# Patient Record
Sex: FEMALE | Race: BLACK OR AFRICAN AMERICAN | Employment: FULL TIME | URBAN - METROPOLITAN AREA
[De-identification: names, ages, dates, MRNs, and addresses within clinical notes are randomized per-mention and may not be internally consistent; named-entity substitution may affect disease eponyms.]

---

## 2021-07-26 ENCOUNTER — HOSPITAL ENCOUNTER (INPATIENT)
Dept: INTERVENTIONAL RADIOLOGY/VASCULAR | Age: 41
Discharge: HOME | DRG: 201 | End: 2021-07-26
Attending: INTERNAL MEDICINE
Payer: COMMERCIAL

## 2021-07-26 ENCOUNTER — APPOINTMENT (OUTPATIENT)
Dept: GENERAL RADIOLOGY | Age: 41
DRG: 201 | End: 2021-07-26
Attending: INTERNAL MEDICINE
Payer: COMMERCIAL

## 2021-07-26 ENCOUNTER — APPOINTMENT (OUTPATIENT)
Dept: GENERAL RADIOLOGY | Age: 41
DRG: 201 | End: 2021-07-26
Attending: EMERGENCY MEDICINE
Payer: COMMERCIAL

## 2021-07-26 ENCOUNTER — HOSPITAL ENCOUNTER (INPATIENT)
Age: 41
LOS: 3 days | Discharge: HOME | DRG: 201 | End: 2021-07-29
Attending: EMERGENCY MEDICINE | Admitting: HOSPITALIST
Payer: COMMERCIAL

## 2021-07-26 VITALS — SYSTOLIC BLOOD PRESSURE: 123 MMHG | HEART RATE: 56 BPM | DIASTOLIC BLOOD PRESSURE: 68 MMHG | OXYGEN SATURATION: 100 %

## 2021-07-26 DIAGNOSIS — J93.9 PNEUMOTHORAX ON LEFT: Primary | ICD-10-CM

## 2021-07-26 DIAGNOSIS — Z72.89 VAPES NON-NICOTINE CONTAINING SUBSTANCE: ICD-10-CM

## 2021-07-26 DIAGNOSIS — R07.9 LEFT-SIDED CHEST PAIN: ICD-10-CM

## 2021-07-26 DIAGNOSIS — J93.83 SPONTANEOUS PNEUMOTHORAX: ICD-10-CM

## 2021-07-26 PROBLEM — F17.211 CIGARETTE NICOTINE DEPENDENCE IN REMISSION: Status: ACTIVE | Noted: 2021-07-26

## 2021-07-26 LAB
ALBUMIN SERPL-MCNC: 3.6 G/DL (ref 3.4–5)
ALBUMIN/GLOB SERPL: 1.2 {RATIO} (ref 0.8–1.7)
ALP SERPL-CCNC: 54 U/L (ref 45–117)
ALT SERPL-CCNC: 24 U/L (ref 13–56)
AMPHET UR QL SCN: NEGATIVE
ANION GAP SERPL CALC-SCNC: 3 MMOL/L (ref 3–18)
APTT PPP: 37.2 SEC (ref 23–36.4)
AST SERPL-CCNC: 20 U/L (ref 10–38)
ATRIAL RATE: 57 BPM
BARBITURATES UR QL SCN: NEGATIVE
BASOPHILS # BLD: 0 K/UL (ref 0–0.1)
BASOPHILS NFR BLD: 1 % (ref 0–2)
BENZODIAZ UR QL: NEGATIVE
BILIRUB SERPL-MCNC: 0.4 MG/DL (ref 0.2–1)
BUN SERPL-MCNC: 18 MG/DL (ref 7–18)
BUN/CREAT SERPL: 26 (ref 12–20)
CALCIUM SERPL-MCNC: 9 MG/DL (ref 8.5–10.1)
CALCULATED P AXIS, ECG09: 72 DEGREES
CALCULATED R AXIS, ECG10: 61 DEGREES
CALCULATED T AXIS, ECG11: 38 DEGREES
CANNABINOIDS UR QL SCN: POSITIVE
CHLORIDE SERPL-SCNC: 110 MMOL/L (ref 100–111)
CK MB CFR SERPL CALC: NORMAL % (ref 0–4)
CK MB SERPL-MCNC: <1 NG/ML (ref 5–25)
CK SERPL-CCNC: 125 U/L (ref 26–192)
CO2 SERPL-SCNC: 27 MMOL/L (ref 21–32)
COCAINE UR QL SCN: NEGATIVE
CREAT SERPL-MCNC: 0.68 MG/DL (ref 0.6–1.3)
DIAGNOSIS, 93000: NORMAL
DIFFERENTIAL METHOD BLD: ABNORMAL
EOSINOPHIL # BLD: 0.1 K/UL (ref 0–0.4)
EOSINOPHIL NFR BLD: 2 % (ref 0–5)
ERYTHROCYTE [DISTWIDTH] IN BLOOD BY AUTOMATED COUNT: 11.9 % (ref 11.6–14.5)
GLOBULIN SER CALC-MCNC: 3.1 G/DL (ref 2–4)
GLUCOSE SERPL-MCNC: 142 MG/DL (ref 74–99)
HCT VFR BLD AUTO: 36.9 % (ref 35–45)
HDSCOM,HDSCOM: ABNORMAL
HGB BLD-MCNC: 12 G/DL (ref 12–16)
INR PPP: 1.1 (ref 0.8–1.2)
LYMPHOCYTES # BLD: 2.3 K/UL (ref 0.9–3.6)
LYMPHOCYTES NFR BLD: 49 % (ref 21–52)
MCH RBC QN AUTO: 30.8 PG (ref 24–34)
MCHC RBC AUTO-ENTMCNC: 32.5 G/DL (ref 31–37)
MCV RBC AUTO: 94.9 FL (ref 74–97)
METHADONE UR QL: NEGATIVE
MONOCYTES # BLD: 0.3 K/UL (ref 0.05–1.2)
MONOCYTES NFR BLD: 6 % (ref 3–10)
NEUTS SEG # BLD: 2 K/UL (ref 1.8–8)
NEUTS SEG NFR BLD: 42 % (ref 40–73)
OPIATES UR QL: NEGATIVE
P-R INTERVAL, ECG05: 130 MS
PCP UR QL: NEGATIVE
PLATELET # BLD AUTO: 237 K/UL (ref 135–420)
PMV BLD AUTO: 9.5 FL (ref 9.2–11.8)
POTASSIUM SERPL-SCNC: 4.4 MMOL/L (ref 3.5–5.5)
PROT SERPL-MCNC: 6.7 G/DL (ref 6.4–8.2)
PROTHROMBIN TIME: 13.4 SEC (ref 11.5–15.2)
Q-T INTERVAL, ECG07: 418 MS
QRS DURATION, ECG06: 78 MS
QTC CALCULATION (BEZET), ECG08: 406 MS
RBC # BLD AUTO: 3.89 M/UL (ref 4.2–5.3)
SODIUM SERPL-SCNC: 140 MMOL/L (ref 136–145)
TROPONIN I SERPL-MCNC: <0.02 NG/ML (ref 0–0.04)
VENTRICULAR RATE, ECG03: 57 BPM
WBC # BLD AUTO: 4.8 K/UL (ref 4.6–13.2)

## 2021-07-26 PROCEDURE — 82553 CREATINE MB FRACTION: CPT

## 2021-07-26 PROCEDURE — 80053 COMPREHEN METABOLIC PANEL: CPT

## 2021-07-26 PROCEDURE — 93005 ELECTROCARDIOGRAM TRACING: CPT

## 2021-07-26 PROCEDURE — 74011250636 HC RX REV CODE- 250/636: Performed by: INTERNAL MEDICINE

## 2021-07-26 PROCEDURE — 74011000250 HC RX REV CODE- 250: Performed by: INTERNAL MEDICINE

## 2021-07-26 PROCEDURE — 85025 COMPLETE CBC W/AUTO DIFF WBC: CPT

## 2021-07-26 PROCEDURE — 85610 PROTHROMBIN TIME: CPT

## 2021-07-26 PROCEDURE — 80307 DRUG TEST PRSMV CHEM ANLYZR: CPT

## 2021-07-26 PROCEDURE — 74011250637 HC RX REV CODE- 250/637: Performed by: FAMILY MEDICINE

## 2021-07-26 PROCEDURE — 65610000006 HC RM INTENSIVE CARE

## 2021-07-26 PROCEDURE — 71046 X-RAY EXAM CHEST 2 VIEWS: CPT

## 2021-07-26 PROCEDURE — 94761 N-INVAS EAR/PLS OXIMETRY MLT: CPT

## 2021-07-26 PROCEDURE — 0W9B30Z DRAINAGE OF LEFT PLEURAL CAVITY WITH DRAINAGE DEVICE, PERCUTANEOUS APPROACH: ICD-10-PCS | Performed by: RADIOLOGY

## 2021-07-26 PROCEDURE — 85730 THROMBOPLASTIN TIME PARTIAL: CPT

## 2021-07-26 PROCEDURE — 99284 EMERGENCY DEPT VISIT MOD MDM: CPT

## 2021-07-26 PROCEDURE — 74011250637 HC RX REV CODE- 250/637: Performed by: HOSPITALIST

## 2021-07-26 PROCEDURE — 74011250636 HC RX REV CODE- 250/636: Performed by: HOSPITALIST

## 2021-07-26 PROCEDURE — C1769 GUIDE WIRE: HCPCS

## 2021-07-26 PROCEDURE — 71045 X-RAY EXAM CHEST 1 VIEW: CPT

## 2021-07-26 RX ORDER — OXYCODONE HYDROCHLORIDE 5 MG/1
10 TABLET ORAL
Status: DISCONTINUED | OUTPATIENT
Start: 2021-07-26 | End: 2021-07-29 | Stop reason: HOSPADM

## 2021-07-26 RX ORDER — MORPHINE SULFATE 2 MG/ML
1 INJECTION, SOLUTION INTRAMUSCULAR; INTRAVENOUS ONCE
Status: COMPLETED | OUTPATIENT
Start: 2021-07-26 | End: 2021-07-26

## 2021-07-26 RX ORDER — MORPHINE SULFATE 2 MG/ML
2 INJECTION, SOLUTION INTRAMUSCULAR; INTRAVENOUS
Status: DISCONTINUED | OUTPATIENT
Start: 2021-07-26 | End: 2021-07-27

## 2021-07-26 RX ORDER — FAMOTIDINE 20 MG/1
20 TABLET, FILM COATED ORAL 2 TIMES DAILY
Status: DISCONTINUED | OUTPATIENT
Start: 2021-07-26 | End: 2021-07-29 | Stop reason: HOSPADM

## 2021-07-26 RX ORDER — ACETAMINOPHEN 500 MG
1000 TABLET ORAL
Status: DISCONTINUED | OUTPATIENT
Start: 2021-07-26 | End: 2021-07-29 | Stop reason: HOSPADM

## 2021-07-26 RX ORDER — FENTANYL CITRATE 50 UG/ML
25-200 INJECTION, SOLUTION INTRAMUSCULAR; INTRAVENOUS
Status: DISCONTINUED | OUTPATIENT
Start: 2021-07-26 | End: 2021-07-26 | Stop reason: ALTCHOICE

## 2021-07-26 RX ORDER — MIDAZOLAM HYDROCHLORIDE 1 MG/ML
.5-4 INJECTION, SOLUTION INTRAMUSCULAR; INTRAVENOUS
Status: DISCONTINUED | OUTPATIENT
Start: 2021-07-26 | End: 2021-07-26 | Stop reason: ALTCHOICE

## 2021-07-26 RX ORDER — LIDOCAINE HYDROCHLORIDE 10 MG/ML
1-20 INJECTION INFILTRATION; PERINEURAL
Status: DISCONTINUED | OUTPATIENT
Start: 2021-07-26 | End: 2021-07-30 | Stop reason: HOSPADM

## 2021-07-26 RX ORDER — SODIUM CHLORIDE 9 MG/ML
500 INJECTION, SOLUTION INTRAVENOUS
Status: COMPLETED | OUTPATIENT
Start: 2021-07-26 | End: 2021-07-26

## 2021-07-26 RX ADMIN — MORPHINE SULFATE 1 MG: 2 INJECTION, SOLUTION INTRAMUSCULAR; INTRAVENOUS at 16:45

## 2021-07-26 RX ADMIN — LIDOCAINE HYDROCHLORIDE 10 ML: 10 INJECTION, SOLUTION INFILTRATION; PERINEURAL at 17:38

## 2021-07-26 RX ADMIN — FAMOTIDINE 20 MG: 20 TABLET ORAL at 20:52

## 2021-07-26 RX ADMIN — OXYCODONE 10 MG: 5 TABLET ORAL at 20:52

## 2021-07-26 RX ADMIN — SODIUM CHLORIDE 500 ML: 900 INJECTION, SOLUTION INTRAVENOUS at 17:38

## 2021-07-26 NOTE — PROCEDURES
Interventional Radiology Brief Procedure Note    Interventional Radiologist: Pedro Luis Worthington MD    Pre-operative Diagnosis: left pneumothorax    Post-operative Diagnosis: Same as pre-operative diagnosis    Procedure(s) Performed:  Left thoracostomy tube placement    Anesthesia:  Local    Findings: 8 fr tube placed left chest. To pleur vac. Full report to follow.      Complications: None    Estimated Blood Loss:  minimal    Specimens: None    Condition: Good    Disposition: olvera    Pedro Luis Worthington MD  [facility]  7/26/2021

## 2021-07-26 NOTE — Clinical Note
Status[de-identified] INPATIENT [101]   Type of Bed: Intensive Care [6]   Cardiac Monitoring Required?: Yes   Inpatient Hospitalization Certified Necessary for the Following Reasons: 3.  Patient receiving treatment that can only be provided in an inpatient setting (further clarification in H&P documentation)   Admitting Diagnosis: Pneumothorax on left [[id number]]   Admitting Physician: Miguel Davis [[id number]]   Attending Physician: Miguel Davis [[id number]]   Estimated Length of Stay: 3-4 Midnights   Discharge Plan[de-identified] Home with Office Follow-up

## 2021-07-26 NOTE — PROGRESS NOTES
Pt transported back to ICU with radiology RNs and angio techs in no apparent distress post chest tube insertion. Pleur-a-vac set up and connected at bedside. Report given to ICU RN bedside.

## 2021-07-26 NOTE — H&P
History & Physical    Patient: Harsha Rowe MRN: [id number]  CSN: [id number]    YOB: 1980  Age: 41 y.o. Sex: female      DOA: 7/26/2021  Primary Care Provider:  Isabella Sanderson MD      Assessment/Plan     Patient Active Problem List   Diagnosis Code    Pneumothorax on left J93.9    Left-sided chest pain R07.9    Vapes non-nicotine containing substance Z72.89    Spontaneous pneumothorax J93.83    Cigarette nicotine dependence in remission F17.211       Admit to ICU    CRITICAL CARE PLAN    Resp -   Left pneumothorax -  Chest tube planned  On oxygen by NC,   Follow daily CXR  Seen by PCCM    ID -   no evidence of infection    CVS -   Monitor HD. Heme/onc -   Follow H&H, plts. Renal -   Trend BUN, Cr. Check and replace Mg, K, phos. Endocrine -    Follow FSG    Neuro/ Pain/ Sedation -   Stable mental status    GI - regular diet. Prophylaxis - DVT: scd, GI: pepcid. Estimated length of stay : TBD    CC: left sided chest pain       HPI:     Harsha Rowe is a 41 y.o. female with no significant past history presents to ER with concerns of left-sided chest pain. Patient reports that she had sharp stabbing chest pain that started 2 days ago. She was seen at urgent care and initially she was told that she has fluid around her heart she was discharged on amoxicillin and indomethacin. This morning she was called and was asked to go to the emergency room as the chest x-ray showed no pneumothorax. She does endorse some shortness of breath but does not appear to be in respiratory distress. She quit smoking and now vapes. In ER she was noted to have left pneumothorax. History reviewed. No pertinent past medical history. Past Surgical History:   Procedure Laterality Date    HX CHOLECYSTECTOMY         History reviewed. No pertinent family history.     Social History     Socioeconomic History    Marital status: SINGLE     Spouse name: Not on file    Number of children: Not on file    Years of education: Not on file    Highest education level: Not on file   Tobacco Use    Smoking status: Never Smoker    Smokeless tobacco: Current User     Social Determinants of Health     Financial Resource Strain:     Difficulty of Paying Living Expenses:    Food Insecurity:     Worried About Running Out of Food in the Last Year:     [address] in the Last Year:    Transportation Needs:     Lack of Transportation (Medical):  Lack of Transportation (Non-Medical):    Physical Activity:     Days of Exercise per Week:     Minutes of Exercise per Session:    Stress:     Feeling of Stress :    Social Connections:     Frequency of Communication with Friends and Family:     Frequency of Social Gatherings with Friends and Family:     Attends [employer] Services:     Active Member of Clubs or Organizations:     Attends Club or Organization Meetings:     Marital Status:        Prior to Admission medications    Not on File       No Known Allergies    Review of Systems  Gen: No fever, chills, malaise, weight loss/gain. Heent: No headache, rhinorrhea, epistaxis, ear pain, hearing loss, sinus pain, neck pain/stiffness, sore throat. Heart: See above  Resp: No cough, hemoptysis, wheezing and shortness of breath. GI: No nausea, vomiting, diarrhea, constipation, melena or hematochezia. : No urinary obstruction, dysuria or hematuria. Derm: No rash, new skin lesion or pruritis. Musc/skeletal: no bone or joint complains. Vasc: No edema, cyanosis or claudication. Endo: No heat/cold intolerance, no polyuria,polydipsia or polyphagia. Neuro: No unilateral weakness, numbness, tingling. No seizures. Heme: No easy bruising or bleeding.           Physical Exam:     Physical Exam:  Visit Vitals  BP (!) 120/98   Pulse (!) 57   Temp 98 °F (36.7 °C)   Resp 14   Ht 5' 4\" (1.626 m)   Wt 61.7 kg (136 lb)   SpO2 100%   BMI 23.34 kg/m²      O2 Device: None (Room air)    Temp (24hrs), Av.1 °F (36.7 °C), Min:98 °F (36.7 °C), Max:98.2 °F (36.8 °C)    No intake/output data recorded. No intake/output data recorded. General:  Awake, cooperative, no distress. Head:  Normocephalic, without obvious abnormality, atraumatic. Eyes:  Conjunctivae/corneas clear, sclera anicteric, PERRL, EOMs intact. Nose: Nares normal. No drainage or sinus tenderness. Throat: Lips, mucosa, and tongue normal.    Neck: Supple, symmetrical, trachea midline, no adenopathy. Lungs:    Absent breath sounds left side. Heart:   S1, S2, no murmur, click, rub or gallop. Abdomen: Soft, non-tender. Bowel sounds normal. No masses,  No organomegaly. Extremities: Extremities normal, atraumatic, no cyanosis or edema. Capillary refill normal.   Pulses: 2+ and symmetric all extremities. Skin: Skin color pink, turgor normal. No rashes or lesions   Neurologic: CNII-XII intact. No focal motor or sensory deficit.        Labs Reviewed:    CMP:   Lab Results   Component Value Date/Time     07/26/2021 01:11 PM    K 4.4 07/26/2021 01:11 PM     07/26/2021 01:11 PM    CO2 27 07/26/2021 01:11 PM    AGAP 3 07/26/2021 01:11 PM     (H) 07/26/2021 01:11 PM    BUN 18 07/26/2021 01:11 PM    CREA 0.68 07/26/2021 01:11 PM    GFRAA >60 07/26/2021 01:11 PM    GFRNA >60 07/26/2021 01:11 PM    CA 9.0 07/26/2021 01:11 PM    ALB 3.6 07/26/2021 01:11 PM    TP 6.7 07/26/2021 01:11 PM    GLOB 3.1 07/26/2021 01:11 PM    AGRAT 1.2 07/26/2021 01:11 PM    ALT 24 07/26/2021 01:11 PM     CBC:   Lab Results   Component Value Date/Time    WBC 4.8 07/26/2021 01:11 PM    HGB 12.0 07/26/2021 01:11 PM    HCT 36.9 07/26/2021 01:11 PM     07/26/2021 01:11 PM     All Cardiac Markers in the last 24 hours:   Lab Results   Component Value Date/Time     07/26/2021 01:11 PM    CKMB <1.0 07/26/2021 01:11 PM    CKND1  07/26/2021 01:11 PM     CALCULATION NOT PERFORMED WHEN RESULT IS BELOW LINEAR LIMIT    Lori Older <0.02 07/26/2021 01:11 PM Procedures/imaging: see electronic medical records for all procedures/Xrays and details which were not copied into this note but were reviewed prior to creation of Plan    Please note that this dictation was completed with [employer], the computer voice recognition software. Quite often unanticipated grammatical, syntax, homophones, and other interpretive errors are inadvertently transcribed by the computer software. Please disregard these errors. Please excuse any errors that have escaped final proofreading.         CC: Pastor Jay MD

## 2021-07-26 NOTE — ED PROVIDER NOTES
EMERGENCY DEPARTMENT HISTORY AND PHYSICAL EXAM    Date: 7/26/2021  Patient Name: Stefano Tadeo    History of Presenting Illness     Chief Complaint   Patient presents with    Shortness of Breath         History Provided By: Patient and EMS    Stefano Tadeo is a 41 y.o. female who presents to the emergency department C/O left-sided chest pain, pneumothorax. Patient states her symptoms started abruptly 2 days ago on Saturday. She localized the pain to the left side of her chest with associated shortness of breath. States on Sunday, yesterday she went to [facility] where she had an x-ray done. States she was told maybe she had some fluid around her heart and was given a prescription for indomethacin and antibiotics. She states the indomethacin does seem to help with her pain. She denies any history of lung problems. Denies any COPD or asthma. She does note that she was a longtime cigarette smoker but has mostly been vaping over the last year. Domenica Watson PCP: None        Past History     Past Medical History:  History reviewed. No pertinent past medical history. Past Surgical History:  History reviewed. No pertinent surgical history. Family History:  History reviewed. No pertinent family history. Social History:  Social History     Tobacco Use    Smoking status: Vapes regularly for over 1 year   Substance Use Topics    Alcohol use: None    Drug use: None       Allergies:  No Known Allergies      Review of Systems   Review of Systems   Constitutional: Negative for fever. Respiratory: Positive for chest tightness and shortness of breath. Cardiovascular: Positive for chest pain. Gastrointestinal: Negative for abdominal pain, nausea and vomiting. All other systems reviewed and are negative. All other systems reviewed and are negative.     Physical Exam     Vitals:    07/26/21 1308   BP: 130/73   Pulse: 62   Resp: 20   Temp: 98.2 °F (36.8 °C)   SpO2: 99%   Weight: 61.7 kg (136 lb) Height: 5' 4\" (1.626 m)     Physical Exam    Nursing notes and vital signs reviewed    Constitutional: Non toxic appearing, no acute distress, appearing stated age  Eyes: PERRL, EOMI, No conjunctival injection  ENT: external ears normal, No rhinorrhea, external nose normal, mucous membranes moist  Cardiovascular: Regular rate and rhythm, no murmurs, No JVD  Respiratory: Mild decreased lung sounds on the left versus the right, No stridor, Normal work of breathing and chest excursion bilaterally  Abdomen: Soft, non tender, non distended, normoactive bowel sounds, No rigidity, no peritoneal signs  Musculoskeletal:  No evidence of obvious injury to Head, Neck, Back, Extremities, no LE edema  Skin: Warm, dry, No obvious rashes  Neuro: Alert and oriented x 3, CN 2-12 intact, normal speech, strength and sensation full and symmetric bilaterally  Psychiatric: Normal mood and affect      Diagnostic Study Results     Labs -     Recent Results (from the past 72 hour(s))   EKG, 12 LEAD, INITIAL    Collection Time: 07/26/21  1:10 PM   Result Value Ref Range    Ventricular Rate 57 BPM    Atrial Rate 57 BPM    P-R Interval 130 ms    QRS Duration 78 ms    Q-T Interval 418 ms    QTC Calculation (Bezet) 406 ms    Calculated P Axis 72 degrees    Calculated R Axis 61 degrees    Calculated T Axis 38 degrees    Diagnosis       Sinus bradycardia  Low voltage QRS  Nonspecific T wave abnormality  Abnormal ECG  No previous ECGs available     CBC WITH AUTOMATED DIFF    Collection Time: 07/26/21  1:11 PM   Result Value Ref Range    WBC 4.8 4.6 - 13.2 K/uL    RBC 3.89 (L) 4.20 - 5.30 M/uL    HGB 12.0 12.0 - 16.0 g/dL    HCT 36.9 35.0 - 45.0 %    MCV 94.9 74.0 - 97.0 FL    MCH 30.8 24.0 - 34.0 PG    MCHC 32.5 31.0 - 37.0 g/dL    RDW 11.9 11.6 - 14.5 %    PLATELET 782 855 - 219 K/uL    MPV 9.5 9.2 - 11.8 FL    NEUTROPHILS 42 40 - 73 %    LYMPHOCYTES 49 21 - 52 %    MONOCYTES 6 3 - 10 %    EOSINOPHILS 2 0 - 5 %    BASOPHILS 1 0 - 2 %    ABS. NEUTROPHILS 2.0 1.8 - 8.0 K/UL    ABS. LYMPHOCYTES 2.3 0.9 - 3.6 K/UL    ABS. MONOCYTES 0.3 0.05 - 1.2 K/UL    ABS. EOSINOPHILS 0.1 0.0 - 0.4 K/UL    ABS. BASOPHILS 0.0 0.0 - 0.1 K/UL    DF AUTOMATED         Radiologic Studies -   XR CHEST INSP AND EXP   Final Result         1. Large left pneumothorax without evidence of mediastinal shift. CRITICAL RESULT:  Findings called to Dr. Queenie Albert in the emergency room prior to   dictation at 1328 hours, 7/26/2021       IR THORACENTESIS/INSERT CHEST TUBE    (Results Pending)     CT Results  (Last 48 hours)    None        CXR Results  (Last 48 hours)    None          Medications given in the ED-  Medications - No data to display      Medical Decision Making     I reviewed the vital signs, available nursing notes, past medical history, past surgical history, family history and social history. Vital Signs interpretation- I have reviewed the patient's vital signs. Pulse Oximetry interpretation - 99% on Room air     Cardiac Monitor interpretation:  Rate: 62 bpm  Rhythm: sinus    EKG interpretation: (Preliminary)  EKG interpretation by Dr. Wale Connell 57 sinus bradycardia, low voltage QRS, nonspecific ST changes and mild baseline wandering. Records Reviewed: Nursing Notes and Old Medical Records    Procedures:  Procedures    ED Course and MDM:  1:22 PM  Chest x-ray does show significant left-sided pneumothorax greater than 50%. CONSULT NOTE:   Dr. Ryan Echavarria spoke with Dr. Kenya Aggarwal   Specialty: pulm  Discussed pt's hx, disposition, and available diagnostic and imaging results over the telephone. Reviewed care plans. States he will come see the patient and eval with IR to put in the chest tube. I discussed with the patient the results of her laboratory findings and imaging studies. Recommended the need for urgent chest tube placement on the left side and admission for continued evaluation of the pneumothorax in the critical care unit.   She understands and agrees to plan    Diagnosis and Disposition     Critical Care Time: 1:58 PM  I have spent 30 minutes of critical care time involved in lab review, consultations with specialist, family decision-making, and documentation. During this entire length of time I was immediately available to the patient. Critical Care: The reason for providing this level of medical care for this critically ill patient was due a critical illness that impaired one or more vital organ systems such that there was a high probability of imminent or life threatening deterioration in the patients condition. This care involved high complexity decision making to assess, manipulate, and support vital system functions, to treat this degreee vital organ system failure and to prevent further life threatening deterioration of the patients condition. Core Measures:  For Hospitalized Patients:    1. Hospitalization Decision Time:  The decision to hospitalize the patient was made by Ines Meier DO at 1:58 PM on 7/26/2021    2. Aspirin: Aspirin was not given because the patient did not present with a stroke at the time of their Emergency Department evaluation    1:58 PM  Patient is being admitted to the hospital by Dr. Tere Loo. The results of their tests and reasons for their admission have been discussed with them and/or available family. They convey agreement and understanding for the need to be admitted and for their admission diagnosis. CONDITIONS ON ADMISSION:  Sepsis is not present at the time of admission. Deep Vein Thrombosis is not present at the time of admission. Thrombosis is not present at the time of admission. Urinary Tract Infection is not present at the time of admission. Pneumonia is not present at the time of admission. MRSA is not present at the time of admission. Wound infection is not present at the time of admission. Pressure Ulcer is not present at the time of admission. CLINICAL IMPRESSION:    1. Pneumothorax on left    2. Left-sided chest pain    3. Vapes non-nicotine containing substance    4. Spontaneous pneumothorax            Please note that this dictation was completed with [employer], the computer voice recognition software. Quite often unanticipated grammatical, syntax, homophones, and other interpretive errors are inadvertently transcribed by the computer software. Please disregard these errors. Please excuse any errors that have escaped final proofreading.

## 2021-07-26 NOTE — PROGRESS NOTES
Pt educated about procedure and sedation, verbalizes understanding. Pt denies complications from sedation/anesthesia in the past. Will continue to monitor.

## 2021-07-26 NOTE — PROGRESS NOTES
1812  Patient complaining of pleuritic pain . . darryl overton.. recvd one tme order for 1 mg morphine

## 2021-07-26 NOTE — CONSULTS
P[facility] Specialists  Pulmonary, Critical Care, and Sleep Medicine    Name: Gerald Bustillo MRN: [id number]   : 1980 Hospital: [facility] [place]    Date: 2021  Room: [facility]/[address] [place] [address] Note                                              Consult requesting physician: Dr. Dev Reddy  Reason for Consult: Pneumothorax      IMPRESSION:   ·   ·   Patient Active Problem List   Diagnosis Code    Pneumothorax on left J93.9    Left-sided chest pain R07.9    Vapes non-nicotine containing substance Z72.89    Spontaneous pneumothorax J93.83    Cigarette nicotine dependence in remission F17.211       · Code status: Full code      RECOMMENDATIONS:   Respiratory: Primary spontaneous left pneumothorax. Hx former cigarette smoker; quit about 2 years ago in 2019; since then started nicotine vaping. On 2021, sudden onset of left-sided sharp stabbing chest pain while lying down and not being active. Urgent care visit on 2021; CXR was performed, initially reported left pericardial likely pneumonia; discharged home on amoxicillin and indomethacin; patient received phone call on 2021 with CXR final report showing left pneumothorax and sent to ER. Repeat CXR 2021 at THE [facility] ER showed large left PTX without mediastinal shift. Patient on room air with SPO2 98%; not in respiratory distress. D/w Dr. Eva Valladares who will place pigtail chest tube catheter. We will place pigtail chest tube catheter at 20 cm of water wall suction. Once air leak resolves in the chest tube apparatus, then will stop the suction and clamp the tube and repeat CXR. If CXR remains without pneumothorax, then will consider removing chest tube depending on the clinical course. Discussed with patient that if the chest tube airleak does not resolve, then she may need thoracic surgery evaluation. Judicious use of pain medication. Start O2 four LPM NC.   Strongly advised and encouraged not to restart smoking cigarettes and quit vaping tobacco.  Keep SPO2 >=92%. HOB 30 degree elevation all the time. Aggressive pulmonary toileting. Aspiration precautions. Incentive spirometry. CVS: Blood pressure stable. Monitor. ID: No acute issues. Hematology/Oncology: Normal hemoglobin, platelets, coags. Monitor. Renal: Normal creatinine. No acute issues. GI/: No acute issues. Endocrine: Monitor BS. Neurology: Alert awake oriented x3. No focal deficit. No acute issues. Toxicology: Urine drug screen ordered. Pain/Sedation: Judicious use of pain medication. Skin/Wound: After chest tube placement, continue with local care at the chest tube site. Electrolytes: Replace electrolytes per ICU electrolyte replacement protocol. IVF: None  Nutrition: P.o. Prophylaxis: DVT Prophylaxis: SCD/start heparin or Lovenox tomorrow if there is no bleeding at chest tube site. GI Prophylaxis: Low risk for stress ulcer. Restraints: none  Lines/Tubes: PIV  Left pigtail chest tube to be placed 7/26/2021 by IR. Will defer respective systems problem management to primary and other respective consultant and follow patient in ICU with primary and other medical team.  Further recommendations will be based on the patient's response to recommended treatment and results of the investigation ordered. Quality Care: PPI, DVT prophylaxis, HOB elevated, Infection control all reviewed and addressed. Care of plan d/w hospitalist team, RN, RT, MDR.  D/w patient, family-mother at bedside in ER (answered all questions to satisfaction). High complexity decision making was performed during the evaluation of this patient at high risk for decompensation with multiple organ involvement. Total critical care time spent rendering care exclusive of procedures/family discussion/coordination of care: 33 minutes.          Subjective/History of Present Illness:     Patient is a 41 y.o. female with PMHx significant for former cigarette smoker quit 2019, nicotine vapor since 2019; admitted with primary spontaneous left pneumothorax. Hx former cigarette smoker; quit about 2 years ago in 2019; since then started nicotine vaping. On 7/24/2021, sudden onset of left-sided sharp stabbing chest pain while lying down and not being active. Urgent care visit on 7/25/2021; CXR was performed, initially reported left pericardial likely pneumonia; discharged home on amoxicillin and indomethacin; patient received phone call on 7/26/2021 with CXR final report showing left pneumothorax and sent to ER. Repeat CXR 7/26/2021 at THE [facility] ER showed large left PTX without mediastinal shift. Patient on room air with SPO2 98%; not in respiratory distress. D/w Dr. Erik Sung who will place pigtail chest tube catheter. 7/26/2021:  Seen in ER room one. Mother at bedside. Patient is sitting on a stretcher. On room air resting; SPO2 98%. Hemodynamically stable. Denies any current chest pain. Denies fever, cough, dyspnea at rest, wheezing, chest tightness, hemoptysis, weight loss, nausea, vomiting, lightheadedness, dizziness. Patient had dyspnea at the time of chest pain on 7/24/2021, resolved now. I/O last 24 hrs: No intake or output data in the 24 hours ending 07/26/21 1434    History taken from patient, EMR     Review of Systems:   HEENT: No epistaxis, no nasal drainage, no difficulty in swallowing, no redness in eyes  Respiratory: as above  Cardiovascular: no chest pain, no palpitations, no chronic leg edema, no syncope  Gastrointestinal: no abd pain, no vomiting, no diarrhea, no bleeding symptoms  Genitourinary: No urinary symptoms or hematuria  Musculoskeletal: Neg  Neurological: No focal weakness, no seizures, no headaches  Behvioral/Psych: No anxiety, no depression  Constitutional: No fever, no chills, no weight loss, no night sweats     No Known Allergies   History reviewed. No pertinent past medical history. History reviewed. No pertinent surgical history.    Social History     Tobacco Use    Smoking status: Never Smoker    Smokeless tobacco: Current User   Substance Use Topics    Alcohol use: Not on file      History reviewed. No pertinent family history. Prior to Admission medications    Not on File     No current facility-administered medications for this encounter. Objective:   Vital Signs:    Visit Vitals  BP 96/71   Pulse 64   Temp 98 °F (36.7 °C)   Resp 20   Ht 5' 4\" (1.626 m)   Wt 61.7 kg (136 lb)   SpO2 (!) 89%   BMI 23.34 kg/m²       O2 Device: None (Room air)       Temp (24hrs), Av.1 °F (36.7 °C), Min:98 °F (36.7 °C), Max:98.2 °F (36.8 °C)       Intake/Output:   Last shift:      No intake/output data recorded. Last 3 shifts: No intake/output data recorded. No intake or output data in the 24 hours ending 21 1434    Last 3 Recorded Weights in this Encounter    21 1308   Weight: 61.7 kg (136 lb)       Physical Exam:     General/Neurology: Alert, Awake, NAD. Head:   Normocephalic, without obvious abnormality, atraumatic. Eye:   EOM intact. No scleral icterus, no pallor, no cyanosis. Nose:   No sinus tenderness  Throat:  Lips, mucosa, and tongue normal. No oral thrush. Neck:   Supple, symmetric. No lymphadenopathy. Trachea midline  Lung: Moderate air entry bilateral equal. No rales. No rhonchi. No wheezing. No stridors. No prolongded expiration. No accessory muscle use. Heart:   Regular rate & rhythm. S1 S2 present. No murmur. No JVD. Abdomen:  Soft. NT. ND. +BS. No masses. Extremities:  No pedal edema. No cyanosis. No clubbing. Pulses: 2+ and symmetric in DP. Capillary refill: normal  Lymphatic:  No cervical or supraclavicular palpable lymphadenopathy. Musculoskeletal: No joint swelling. No tenderness. Skin:   Color, texture, turgor normal. No rashes or lesions.        Data:       Recent Results (from the past 24 hour(s))   EKG, 12 LEAD, INITIAL    Collection Time: 21  1:10 PM   Result Value Ref Range Ventricular Rate 57 BPM    Atrial Rate 57 BPM    P-R Interval 130 ms    QRS Duration 78 ms    Q-T Interval 418 ms    QTC Calculation (Bezet) 406 ms    Calculated P Axis 72 degrees    Calculated R Axis 61 degrees    Calculated T Axis 38 degrees    Diagnosis       Sinus bradycardia  Low voltage QRS  Nonspecific T wave abnormality  Abnormal ECG  No previous ECGs available     CBC WITH AUTOMATED DIFF    Collection Time: 07/26/21  1:11 PM   Result Value Ref Range    WBC 4.8 4.6 - 13.2 K/uL    RBC 3.89 (L) 4.20 - 5.30 M/uL    HGB 12.0 12.0 - 16.0 g/dL    HCT 36.9 35.0 - 45.0 %    MCV 94.9 74.0 - 97.0 FL    MCH 30.8 24.0 - 34.0 PG    MCHC 32.5 31.0 - 37.0 g/dL    RDW 11.9 11.6 - 14.5 %    PLATELET 916 964 - 647 K/uL    MPV 9.5 9.2 - 11.8 FL    NEUTROPHILS 42 40 - 73 %    LYMPHOCYTES 49 21 - 52 %    MONOCYTES 6 3 - 10 %    EOSINOPHILS 2 0 - 5 %    BASOPHILS 1 0 - 2 %    ABS. NEUTROPHILS 2.0 1.8 - 8.0 K/UL    ABS. LYMPHOCYTES 2.3 0.9 - 3.6 K/UL    ABS. MONOCYTES 0.3 0.05 - 1.2 K/UL    ABS. EOSINOPHILS 0.1 0.0 - 0.4 K/UL    ABS. BASOPHILS 0.0 0.0 - 0.1 K/UL    DF AUTOMATED     METABOLIC PANEL, COMPREHENSIVE    Collection Time: 07/26/21  1:11 PM   Result Value Ref Range    Sodium 140 136 - 145 mmol/L    Potassium 4.4 3.5 - 5.5 mmol/L    Chloride 110 100 - 111 mmol/L    CO2 27 21 - 32 mmol/L    Anion gap 3 3.0 - 18 mmol/L    Glucose 142 (H) 74 - 99 mg/dL    BUN 18 7.0 - 18 MG/DL    Creatinine 0.68 0.6 - 1.3 MG/DL    BUN/Creatinine ratio 26 (H) 12 - 20      GFR est AA >60 >60 ml/min/1.73m2    GFR est non-AA >60 >60 ml/min/1.73m2    Calcium 9.0 8.5 - 10.1 MG/DL    Bilirubin, total 0.4 0.2 - 1.0 MG/DL    ALT (SGPT) 24 13 - 56 U/L    AST (SGOT) 20 10 - 38 U/L    Alk.  phosphatase 54 45 - 117 U/L    Protein, total 6.7 6.4 - 8.2 g/dL    Albumin 3.6 3.4 - 5.0 g/dL    Globulin 3.1 2.0 - 4.0 g/dL    A-G Ratio 1.2 0.8 - 1.7     CARDIAC PANEL,(CK, CKMB & TROPONIN)    Collection Time: 07/26/21  1:11 PM   Result Value Ref Range    CK - MB <1.0 <3.6 ng/ml    CK-MB Index  0.0 - 4.0 %     CALCULATION NOT PERFORMED WHEN RESULT IS BELOW LINEAR LIMIT     26 - 192 U/L    Troponin-I, QT <0.02 0.0 - 0.045 NG/ML   PROTHROMBIN TIME + INR    Collection Time: 07/26/21  1:11 PM   Result Value Ref Range    Prothrombin time 13.4 11.5 - 15.2 sec    INR 1.1 0.8 - 1.2     PTT    Collection Time: 07/26/21  1:11 PM   Result Value Ref Range    aPTT 37.2 (H) 23.0 - 36.4 SEC         Chemistry Recent Labs     07/26/21  1311   *      K 4.4      CO2 27   BUN 18   CREA 0.68   CA 9.0   AGAP 3   BUCR 26*   AP 54   TP 6.7   ALB 3.6   GLOB 3.1   AGRAT 1.2        Lactic Acid No results found for: LAC  No results for input(s): LAC in the last 72 hours. Liver Enzymes Protein, total   Date Value Ref Range Status   07/26/2021 6.7 6.4 - 8.2 g/dL Final     Albumin   Date Value Ref Range Status   07/26/2021 3.6 3.4 - 5.0 g/dL Final     Globulin   Date Value Ref Range Status   07/26/2021 3.1 2.0 - 4.0 g/dL Final     A-G Ratio   Date Value Ref Range Status   07/26/2021 1.2 0.8 - 1.7   Final     Alk.  phosphatase   Date Value Ref Range Status   07/26/2021 54 45 - 117 U/L Final     Recent Labs     07/26/21  1311   TP 6.7   ALB 3.6   GLOB 3.1   AGRAT 1.2   AP 54        CBC w/Diff Recent Labs     07/26/21  1311   WBC 4.8   RBC 3.89*   HGB 12.0   HCT 36.9      GRANS 42   LYMPH 49   EOS 2        Cardiac Enzymes Lab Results   Component Value Date/Time     07/26/2021 01:11 PM    CKMB <1.0 07/26/2021 01:11 PM    CKND1  07/26/2021 01:11 PM     CALCULATION NOT PERFORMED WHEN RESULT IS BELOW LINEAR LIMIT    Deborah Flies <0.02 07/26/2021 01:11 PM        BNP No results found for: BNP, BNPP, XBNPT     Coagulation Recent Labs     07/26/21  1311   PTP 13.4   INR 1.1   APTT 37.2*         Thyroid  No results found for: T4, T3U, TSH, TSHEXT, TSHEXT    No results found for: T4     Urinalysis Lab Results   Component Value Date/Time    Color YELLOW 12/06/2014 02:28 AM Appearance CLEAR 12/06/2014 02:28 AM    Specific gravity 1.020 12/06/2014 02:28 AM    pH (UA) 7.5 12/06/2014 02:28 AM    Protein NEGATIVE  12/06/2014 02:28 AM    Glucose NEGATIVE  12/06/2014 02:28 AM    Ketone NEGATIVE  12/06/2014 02:28 AM    Bilirubin NEGATIVE  12/06/2014 02:28 AM    Urobilinogen 0.2 12/06/2014 02:28 AM    Nitrites NEGATIVE  12/06/2014 02:28 AM    Leukocyte Esterase TRACE (A) 12/06/2014 02:28 AM    Epithelial cells 3+ 12/06/2014 02:28 AM    Bacteria 2+ (A) 12/06/2014 02:28 AM    WBC 3 to 5 12/06/2014 02:28 AM    RBC 0 to 3 12/06/2014 02:28 AM        Micro  No results for input(s): SDES, CULT in the last 72 hours. No results for input(s): CULT in the last 72 hours. Culture data during this hospitalization. All Micro Results     None               CXR reviewed by me:  XR (Most Recent). CXR  reviewed by me and compared with previous CXR Results from Hospital Encounter encounter on 07/26/21    XR CHEST INSP AND EXP    Narrative  EXAM: XR CHEST INSP AND EXP    CLINICAL INDICATION/HISTORY: Left-sided pneumothorax and chest pain  -Additional: None    COMPARISON: None    TECHNIQUE: Frontal view of the chest obtained across inspiration and expiration    _______________    FINDINGS:    HEART AND MEDIASTINUM: Normal cardiac size and mediastinal contours. LUNGS AND PLEURAL SPACES: Large left-sided pneumothorax with collapse of the  left upper and left lower lobes. No evidence of mediastinal shift. Right lung  clear. BONY THORAX AND SOFT TISSUES: No acute osseous abnormality demonstrated.    _______________    Impression  1. Large left pneumothorax without evidence of mediastinal shift. CRITICAL RESULT:  Findings called to Dr. Enmanuel Anand in the emergency room prior to  dictation at 1328 hours, 7/26/2021         ·Please note: Voice-recognition software may have been used to generate this report, which may have resulted in some phonetic-based errors in grammar and contents.  Even though attempts were made to correct all the mistakes, some may have been missed, and remained in the body of the document.       Germán Benitez MD  7/26/2021

## 2021-07-26 NOTE — ED TRIAGE NOTES
Patient with complaints of shortness of breath that started on Saturday. Patient was seen yesterday and placed on antibiotics yesterday.   Patient was called today and told she had a collapsed lung and to call [phone]

## 2021-07-27 ENCOUNTER — APPOINTMENT (OUTPATIENT)
Dept: GENERAL RADIOLOGY | Age: 41
DRG: 201 | End: 2021-07-27
Attending: INTERNAL MEDICINE
Payer: COMMERCIAL

## 2021-07-27 LAB
ANION GAP SERPL CALC-SCNC: 5 MMOL/L (ref 3–18)
BUN SERPL-MCNC: 9 MG/DL (ref 7–18)
BUN/CREAT SERPL: 17 (ref 12–20)
CALCIUM SERPL-MCNC: 8.7 MG/DL (ref 8.5–10.1)
CHLORIDE SERPL-SCNC: 109 MMOL/L (ref 100–111)
CO2 SERPL-SCNC: 27 MMOL/L (ref 21–32)
CREAT SERPL-MCNC: 0.52 MG/DL (ref 0.6–1.3)
ERYTHROCYTE [DISTWIDTH] IN BLOOD BY AUTOMATED COUNT: 12 % (ref 11.6–14.5)
GLUCOSE SERPL-MCNC: 82 MG/DL (ref 74–99)
HCT VFR BLD AUTO: 34.7 % (ref 35–45)
HGB BLD-MCNC: 11.1 G/DL (ref 12–16)
MCH RBC QN AUTO: 30.4 PG (ref 24–34)
MCHC RBC AUTO-ENTMCNC: 32 G/DL (ref 31–37)
MCV RBC AUTO: 95.1 FL (ref 74–97)
PLATELET # BLD AUTO: 226 K/UL (ref 135–420)
PMV BLD AUTO: 9.9 FL (ref 9.2–11.8)
POTASSIUM SERPL-SCNC: 3.8 MMOL/L (ref 3.5–5.5)
RBC # BLD AUTO: 3.65 M/UL (ref 4.2–5.3)
SODIUM SERPL-SCNC: 141 MMOL/L (ref 136–145)
WBC # BLD AUTO: 6.2 K/UL (ref 4.6–13.2)

## 2021-07-27 PROCEDURE — 77010033678 HC OXYGEN DAILY

## 2021-07-27 PROCEDURE — 71045 X-RAY EXAM CHEST 1 VIEW: CPT

## 2021-07-27 PROCEDURE — 74011250637 HC RX REV CODE- 250/637: Performed by: FAMILY MEDICINE

## 2021-07-27 PROCEDURE — 74011250636 HC RX REV CODE- 250/636: Performed by: HOSPITALIST

## 2021-07-27 PROCEDURE — 74011250637 HC RX REV CODE- 250/637: Performed by: INTERNAL MEDICINE

## 2021-07-27 PROCEDURE — 36415 COLL VENOUS BLD VENIPUNCTURE: CPT

## 2021-07-27 PROCEDURE — 80048 BASIC METABOLIC PNL TOTAL CA: CPT

## 2021-07-27 PROCEDURE — 65610000006 HC RM INTENSIVE CARE

## 2021-07-27 PROCEDURE — 74011250637 HC RX REV CODE- 250/637: Performed by: HOSPITALIST

## 2021-07-27 PROCEDURE — 85027 COMPLETE CBC AUTOMATED: CPT

## 2021-07-27 RX ORDER — POTASSIUM CHLORIDE 20 MEQ/1
20 TABLET, EXTENDED RELEASE ORAL ONCE
Status: COMPLETED | OUTPATIENT
Start: 2021-07-27 | End: 2021-07-27

## 2021-07-27 RX ORDER — ENOXAPARIN SODIUM 100 MG/ML
40 INJECTION SUBCUTANEOUS EVERY 24 HOURS
Status: DISCONTINUED | OUTPATIENT
Start: 2021-07-27 | End: 2021-07-29 | Stop reason: HOSPADM

## 2021-07-27 RX ADMIN — POTASSIUM CHLORIDE 20 MEQ: 20 TABLET, EXTENDED RELEASE ORAL at 17:05

## 2021-07-27 RX ADMIN — OXYCODONE 10 MG: 5 TABLET ORAL at 22:18

## 2021-07-27 RX ADMIN — FAMOTIDINE 20 MG: 20 TABLET ORAL at 10:48

## 2021-07-27 RX ADMIN — OXYCODONE 10 MG: 5 TABLET ORAL at 17:05

## 2021-07-27 RX ADMIN — FAMOTIDINE 20 MG: 20 TABLET ORAL at 22:18

## 2021-07-27 RX ADMIN — OXYCODONE 10 MG: 5 TABLET ORAL at 10:48

## 2021-07-27 RX ADMIN — OXYCODONE 10 MG: 5 TABLET ORAL at 03:10

## 2021-07-27 RX ADMIN — ENOXAPARIN SODIUM 40 MG: 40 INJECTION SUBCUTANEOUS at 10:48

## 2021-07-27 NOTE — PROGRESS NOTES
Reason for Admission:  Chest pain and SOB                     RUR Score: low                    Plan for utilizing home health:   TBD       PCP: First and Last name:  Billy Durham MD     Name of Practice:    Are you a current patient: Yes/No:    Approximate date of last visit:    Can you participate in a virtual visit with your PCP:                     Current Advanced Directive/Advance Care Plan: Full Code      Healthcare Decision Maker:   Click here to complete [address] including selection of the Healthcare Decision Maker Relationship (ie \"Primary\")                             Transition of Care Plan:   Chart reviewed , pt admitted to icu for pneumothorax diagnosis, hx includes cigarette nicotine dependence, chest tube placed yesterday, at this time transition of care not determined, cm will cont to review and remain avalable for d/c planning. Care Management Interventions  PCP Verified by CM: Yes  Palliative Care Criteria Met (RRAT>21 & CHF Dx)?: No  Current Support Network:  Other

## 2021-07-27 NOTE — PROGRESS NOTES
Patient complains of  left-sided chest pain. The chest tube is clamped. Repeat CXR performed which did not show any pneumothorax. I think her chest pain is likely due to the chest tube in place. Continue chest tube clamped. If patient has any dyspnea or chest pain, then repeat CXR. If CXR shows pneumothorax, then unclamp the chest tube and put at wall suction at 20 cm of water. Otherwise repeat CXR in the morning. Discussed with RN. Results from Hospital Encounter encounter on 07/26/21    XR CHEST PORT    Narrative  EXAM: XR CHEST PORT    CLINICAL INDICATION/HISTORY: chest tube, left side pain  -Additional: None    COMPARISON: 7/27/2021    TECHNIQUE: Portable frontal view of the chest    _______________    FINDINGS:    SUPPORT DEVICES: None. HEART AND MEDIASTINUM: Cardiomediastinal silhouette within normal limits. LUNGS AND PLEURAL SPACES: No dense consolidation, large effusion or  pneumothorax.    _______________    Impression  No acute cardiopulmonary abnormality.     Germán Benitez MD 7/27/2021 5:07 PM

## 2021-07-27 NOTE — PROGRESS NOTES
conducted an initial consultation and spiritual assessment for Pankaj Gaytan, who is a 41 y.o.,female. Leann visit with patient who has good family support. We discussed an Advance Medical Directive but she is comfortable knowing that her 21year old daughter and then her mother are her legal next of kin and whom she would want to speak for her if needed. Patient admits that her vaping is \"what caused this - and I'm done. No more vaping - ever. \"  \"This was a real wake up call. I gave up cigarrettes but then started vaping. I knew better. But I never want to feel this way again. \"    The reason the Patient came to the hospital is:   Patient Active Problem List    Diagnosis Date Noted    Pneumothorax on left 07/26/2021    Left-sided chest pain 07/26/2021    Vapes non-nicotine containing substance 07/26/2021    Spontaneous pneumothorax 07/26/2021    Cigarette nicotine dependence in remission 07/26/2021        Initiated a relationship of care and support. Listened empathically. Provided information about Spiritual Care Services. Offered prayer and assurance of continued prayers on patients behalf. Chart reviewed. Patient shared information about her medical narrative and beliefs.  confirmed Patient's Yazidism Affiliation. Patient processed feelings about current hospitalization. Patient expressed gratitude for Spiritual Care visit. Chaplains will continue to follow and will provide pastoral care as needed or requested.  recommends bedside caregivers page  on duty if patient shows signs of acute spiritual or emotional distress. [address] \[facility] [place] [facility]\"", [place].   Board Certified   [phone] - Office

## 2021-07-27 NOTE — PROGRESS NOTES
CXR after being chest tube and waterseal for about 4 to 5 hours showed no appreciable pneumothorax. We will clamp the chest tube now and repeat CXR in about 4 to 5 hours. If there is no pneumothorax on this repeat CXR later in the evening, then will leave the chest tube clamped. If there is pneumothorax on this repeat CXR later in this evening, then we will need to unclamp the chest tube and Pleur-evac wall suction at 20 cm of water and repeat CXR in the morning. Discussed with RN will call me with the CXR report later in the evening. Results from Hospital Encounter encounter on 07/26/21    XR CHEST PORT    Narrative  EXAM: XR CHEST PORT    CLINICAL INDICATION/HISTORY: Chest tube, left-sided pneumothorax.  -Additional: None    COMPARISON: Several prior radiographs, most recently July 27, 2021    TECHNIQUE: Portable frontal view of the chest    _______________    FINDINGS:    SUPPORT DEVICES: Left-sided thoracostomy tube in stable position with formed  loop at the periphery of the left midlung zone. HEART AND MEDIASTINUM: Normal cardiac size and mediastinal contours. LUNGS AND PLEURAL SPACES: No appreciable pneumothorax. Lungs appear clear. No  pleural effusion. BONY THORAX AND SOFT TISSUES: Unremarkable.    _______________    Impression  1. Indwelling left-sided thoracostomy tube without appreciable pneumothorax.     Kyaw Curiel MD 7/27/2021 2:59 PM

## 2021-07-27 NOTE — PROGRESS NOTES
0700 Bedside and Verbal shift change report given to MIKAYLA Gonzales (oncoming nurse) by Laura Longoria RN (offgoing nurse). Report included the following information SBAR, Kardex, Intake/Output, Recent Results and Cardiac Rhythm SB.     0800 Shift assessment completed, see flowsheet. Denies pain or SOB. Chest tube suctioning w/o difficulty. 0945 Suction to chest tube stopped. Will repeat CXR at 1400.     1200 Reassessment, no changes. 1500 Dr. Devika Peterson aware of CXR results. Chest tube clamped at this time. Will repeat CXR in 4 hours. Critical care continues. 1610 Patient complaining of 8/10 left pleuritic pain. Stated that it started once she used IS. States that she can not tell if it is worse than usual discomfort felt. O2 sats 100% on RA, denies SOB, just discomfort with inspiration. Dr. Devika Peterson aware. Will obtain stat CXR. Patient does not wish for pain medication at this time. [address]. Devika Peterson aware of CXR results. If patient develops any further issues through the night, please order CXR. Otherwise, repeat CXR in the morning. Critical care  Continues. 1900 Bedside and Verbal shift change report given to Laura Longoria RN (oncoming nurse) by Princess Aggarwal RN (offgoing nurse).  Report included the following information SBAR, Kardex, Intake/Output, Recent Results and Cardiac Rhythm SB.

## 2021-07-27 NOTE — PROGRESS NOTES
Hospitalist Progress Note    Patient: Harsha Rowe MRN: [id number]  CSN: [id number]    YOB: 1980  Age: 41 y.o. Sex: female    DOA: 7/26/2021 LOS:  LOS: 1 day                Assessment/Plan     Patient Active Problem List   Diagnosis Code    Pneumothorax on left J93.9    Left-sided chest pain R07.9    Vapes non-nicotine containing substance Z72.89    Spontaneous pneumothorax J93.83    Cigarette nicotine dependence in remission F17.211            41 y.o. female with no significant past history presents to ER with concerns of left-sided chest pain. Admitted for left spontaneous pneumothorax. CRITICAL CARE PLAN     Resp -   Left pneumothorax -  Chest tube pllaced  Off oxygen   Follow daily CXR  CXR today with no pneumothorax  Suction stopped and tube clamped.     ID -   no evidence of infection     CVS -   Monitor HD.       Heme/onc -   Follow H&H, plts.      Renal -   Trend BUN, Cr. Check and replace Mg, K, phos.     Endocrine -    Follow FSG     Neuro/ Pain/ Sedation -   Stable mental status     GI - regular diet.     Prophylaxis - DVT: lovenox, scd, GI: pepcid. Disposition : 1-2 days    Review of systems  General: No fevers or chills. Cardiovascular: No chest pain or pressure. No palpitations. Pulmonary: No shortness of breath. Gastrointestinal: No nausea, vomiting. Physical Exam:  General: Awake, cooperative, no acute distress    HEENT: NC, Atraumatic. PERRLA, anicteric sclerae. Lungs: CTA Bilaterally. No Wheezing/Rhonchi/Rales. Heart:  S1 S2,  No murmur, No Rubs, No Gallops  Abdomen: Soft, Non distended, Non tender.  +Bowel sounds,   Extremities: No c/c/e  Psych:   Not anxious or agitated. Neurologic:  No acute neurological deficit.            Vital signs/Intake and Output:  Visit Vitals  /74   Pulse 60   Temp 97.9 °F (36.6 °C)   Resp 18   Ht 5' 4\" (1.626 m)   Wt 61.7 kg (136 lb)   SpO2 100%   BMI 23.34 kg/m²     Current Shift:  No intake/output data recorded. Last three shifts:  07/25 1901 - 07/27 0700  In: -   Out: 1141 [Urine:1100]            Labs: Results:       Chemistry Recent Labs     07/27/21  0455 07/26/21  1311   GLU 82 142*    140   K 3.8 4.4    110   CO2 27 27   BUN 9 18   CREA 0.52* 0.68   CA 8.7 9.0   AGAP 5 3   BUCR 17 26*   AP  --  54   TP  --  6.7   ALB  --  3.6   GLOB  --  3.1   AGRAT  --  1.2      CBC w/Diff Recent Labs     07/27/21  0455 07/26/21  1311   WBC 6.2 4.8   RBC 3.65* 3.89*   HGB 11.1* 12.0   HCT 34.7* 36.9    237   GRANS  --  42   LYMPH  --  49   EOS  --  2      Cardiac Enzymes Recent Labs     07/26/21  1311      CKND1 CALCULATION NOT PERFORMED WHEN RESULT IS BELOW LINEAR LIMIT      Coagulation Recent Labs     07/26/21  1311   PTP 13.4   INR 1.1   APTT 37.2*       Lipid Panel No results found for: CHOL, CHOLPOCT, CHOLX, CHLST, CHOLV, 027766, HDL, HDLP, LDL, LDLC, DLDLP, 271249, VLDLC, VLDL, TGLX, TRIGL, TRIGP, TGLPOCT, CHHD, CHHDX   BNP No results for input(s): BNPP in the last 72 hours.    Liver Enzymes Recent Labs     07/26/21  1311   TP 6.7   ALB 3.6   AP 54      Thyroid Studies No results found for: T4, T3U, TSH, TSHEXT     Procedures/imaging: see electronic medical records for all procedures/Xrays and details which were not copied into this note but were reviewed prior to creation of Plan

## 2021-07-27 NOTE — PROGRESS NOTES
Problem: Falls - Risk of  Goal: *Absence of Falls  Description: Document Edis Lindo Fall Risk and appropriate interventions in the flowsheet.   Outcome: Progressing Towards Goal  Note: Fall Risk Interventions:  Mobility Interventions: Assess mobility with egress test, Bed/chair exit alarm, Communicate number of staff needed for ambulation/transfer         Medication Interventions: Bed/chair exit alarm, Evaluate medications/consider consulting pharmacy    Elimination Interventions: Bed/chair exit alarm, Call light in reach, Patient to call for help with toileting needs, Stay With Me (per policy), Toilet paper/wipes in reach, Toileting schedule/hourly rounds              Problem: Patient Education: Go to Patient Education Activity  Goal: Patient/Family Education  Outcome: Progressing Towards Goal

## 2021-07-27 NOTE — PROGRESS NOTES
[facility]  Pulmonary, Critical Care, and Sleep Medicine    Name: Nael Parikh MRN: [id number]   : 1980 Hospital: [facility] [place] [facility]    Date: 2021  Room: [address] [place] [address] Note                                              Consult requesting physician: Dr. Enmanuel Anand  Reason for Consult: Pneumothorax      IMPRESSION:   ·   ·   Patient Active Problem List   Diagnosis Code    Pneumothorax on left J93.9    Left-sided chest pain R07.9    Vapes non-nicotine containing substance Z72.89    Spontaneous pneumothorax J93.83    Cigarette nicotine dependence in remission F17.211       · Code status: Full code      RECOMMENDATIONS:   Respiratory: Primary spontaneous left pneumothorax; s/p pigtail chest tube placed 2021 with resolved pneumothorax. Hx former cigarette smoker; quit about 2 years ago in 2019; since then started nicotine vaping. On 2021, sudden onset of left-sided sharp stabbing chest pain while lying down and not being active. Urgent care visit on 2021; CXR was performed, initially reported left pericardial likely pneumonia; discharged home on amoxicillin and indomethacin; patient received phone call on 2021 with CXR final report showing left pneumothorax and sent to ER. Repeat CXR 2021 at THE [facility] ER showed large left PTX without mediastinal shift. Patient on room air with SPO2 98%; not in respiratory distress. S/p IR guided left pigtail chest tube placed 2021 which improved pneumothorax on 2021. CXR 2021 reviewed: No pneumothorax. Left pigtail chest tube catheter in place. Midlung scarring or subsegmental atelectasis. No air leak seen on a chest tube apparatus. We will stop the wall suction and repeat CXR in 4 to 6 hours. If there is no pneumothorax, then will clamp the tube and leave for 24 hours and then repeat CXR tomorrow AM.  Discussed with patient to keep O2 on.   Urine drug screen positive for THC.  Strongly advised and encouraged not to restart smoking cigarettes and quit vaping tobacco and advised to stop using THC/marijuana. Keep SPO2 >=92%. HOB 30 degree elevation all the time. Aggressive pulmonary toileting. Aspiration precautions. Incentive spirometry. CVS: Hemodynamically stable. Monitor. ID: No acute issues. Hematology/Oncology: Normal hemoglobin, platelets, coags. Monitor. Renal: Normal creatinine. No acute issues. GI/: No acute issues. Endocrine: Monitor BS. Neurology: Alert awake oriented x3. No focal deficit. No acute issues. Toxicology: Urine drug screen positive for THC; advised to stop using marijuana. Pain/Sedation: Judicious use of pain medication. Skin/Wound: Continue dressing at pigtail chest tube catheter site. Electrolytes: Replace electrolytes per ICU electrolyte replacement protocol. IVF: None  Nutrition: P.o. Prophylaxis: DVT Prophylaxis: SCD/Lovenox. GI Prophylaxis: Pepcid. Restraints: none  Lines/Tubes: PIV  Left pigtail chest tube: 7/26/2021 by IR. Will defer respective systems problem management to primary and other respective consultant and follow patient in ICU with primary and other medical team.  Further recommendations will be based on the patient's response to recommended treatment and results of the investigation ordered. Quality Care: PPI, DVT prophylaxis, HOB elevated, Infection control all reviewed and addressed. Care of plan d/w hospitalist team, RN, RT, MDR. High complexity decision making was performed during the evaluation of this patient at high risk for decompensation with multiple organ involvement. Total critical care time spent rendering care exclusive of procedures/family discussion/coordination of care: 32 minutes.          Subjective/History of Present Illness:     Patient is a 41 y.o. female with PMHx significant for former cigarette smoker quit 2019, nicotine vapor since 2019; admitted with primary spontaneous left pneumothorax. Hx former cigarette smoker; quit about 2 years ago in 2019; since then started nicotine vaping. On 7/24/2021, sudden onset of left-sided sharp stabbing chest pain while lying down and not being active. Urgent care visit on 7/25/2021; CXR was performed, initially reported left pericardial likely pneumonia; discharged home on amoxicillin and indomethacin; patient received phone call on 7/26/2021 with CXR final report showing left pneumothorax and sent to ER. Repeat CXR 7/26/2021 at THE [facility] ER showed large left PTX without mediastinal shift. Patient on room air with SPO2 98%; not in respiratory distress. S/p left pigtail chest tube placed 7/26/2021.    7/27/2021:  Remains in ICU room 101. S/p left pigtail chest tube placed by IR; with improved pneumothorax on CXR 7/26/2021. Repeat CXR 7/27/2021 showed no pneumothorax. There is suspected left midlung subsegmental atelectasis or scarring. Patient pulled off oxygen; SPO2 98%; advised to keep oxygen on as it will help in pneumothorax resolution. Denies chest pain, dyspnea, cough, pneumonia disease, fever. Hemodynamically stable. No other overnight issues reported. [facility] I/O last 24 hrs:      Intake/Output Summary (Last 24 hours) at 7/27/2021 8796  Last data filed at 7/27/2021 3582  Gross per 24 hour   Intake    Output 1141 ml   Net -1141 ml       History taken from patient, EMR     Review of Systems:   HEENT: No epistaxis, no nasal drainage, no difficulty in swallowing, no redness in eyes  Respiratory: as above  Cardiovascular: no chest pain, no palpitations, no chronic leg edema, no syncope  Gastrointestinal: no abd pain, no vomiting, no diarrhea, no bleeding symptoms  Genitourinary: No urinary symptoms or hematuria  Musculoskeletal: Neg  Neurological: No focal weakness, no seizures, no headaches  Behvioral/Psych: No anxiety, no depression  Constitutional: No fever, no chills, no weight loss, no night sweats     No Known Allergies   History reviewed. No pertinent past medical history. Past Surgical History:   Procedure Laterality Date    HX CHOLECYSTECTOMY        Social History     Tobacco Use    Smoking status: Never Smoker    Smokeless tobacco: Current User   Substance Use Topics    Alcohol use: Not on file      History reviewed. No pertinent family history. Prior to Admission medications    Not on File     Current Facility-Administered Medications   Medication Dose Route Frequency    enoxaparin (LOVENOX) injection 40 mg  40 mg SubCUTAneous Q24H    famotidine (PEPCID) tablet 20 mg  20 mg Oral BID         Objective:   Vital Signs:    Visit Vitals  /75   Pulse (!) 51   Temp 97.9 °F (36.6 °C)   Resp 16   Ht 5' 4\" (1.626 m)   Wt 61.7 kg (136 lb)   SpO2 100%   BMI 23.34 kg/m²       O2 Device: Nasal cannula   O2 Flow Rate (L/min): 4 l/min   Temp (24hrs), Av °F (36.7 °C), Min:97.9 °F (36.6 °C), Max:98.2 °F (36.8 °C)       Intake/Output:   Last shift:      No intake/output data recorded. Last 3 shifts:  1901 -  0700  In: -   Out: 1141 [Urine:1100]      Intake/Output Summary (Last 24 hours) at 2021 0923  Last data filed at 2021 0659  Gross per 24 hour   Intake    Output 1141 ml   Net -1141 ml       Last 3 Recorded Weights in this Encounter    21 1308   Weight: 61.7 kg (136 lb)       Physical Exam:       General/Neurology: Alert, awake and oriented. NAD. Left lateral pigtail chest tube in place, without any bleeding. No subcutaneous emphysema. Head:   NCAT. Eye:   EOM intact. No icterus/pallor/cyanosis. Nose:   No nasal drainage/discharge. Neck:   Trachea midline. Lung: Moderate air entry bilateral equal.  No rales, rhonchi. No wheezing or stridor. No prolonged expiration or accessory muscle use. Heart:   S1 S2 present. No murmur or JVD. Abdomen:  Soft. NT. ND. No palpable masses. Extremities:  No edema. No cyanosis or clubbing.   Pulses: 2+ and symmetric in DP. Data:       Recent Results (from the past 24 hour(s))   EKG, 12 LEAD, INITIAL    Collection Time: 07/26/21  1:10 PM   Result Value Ref Range    Ventricular Rate 57 BPM    Atrial Rate 57 BPM    P-R Interval 130 ms    QRS Duration 78 ms    Q-T Interval 418 ms    QTC Calculation (Bezet) 406 ms    Calculated P Axis 72 degrees    Calculated R Axis 61 degrees    Calculated T Axis 38 degrees    Diagnosis       Sinus bradycardia  Low voltage QRS  Nonspecific T wave abnormality  Abnormal ECG  No previous ECGs available  Confirmed by Georgiana Gaona MD. ([id number]) on 7/26/2021 10:58:34 PM     CBC WITH AUTOMATED DIFF    Collection Time: 07/26/21  1:11 PM   Result Value Ref Range    WBC 4.8 4.6 - 13.2 K/uL    RBC 3.89 (L) 4.20 - 5.30 M/uL    HGB 12.0 12.0 - 16.0 g/dL    HCT 36.9 35.0 - 45.0 %    MCV 94.9 74.0 - 97.0 FL    MCH 30.8 24.0 - 34.0 PG    MCHC 32.5 31.0 - 37.0 g/dL    RDW 11.9 11.6 - 14.5 %    PLATELET 089 526 - 662 K/uL    MPV 9.5 9.2 - 11.8 FL    NEUTROPHILS 42 40 - 73 %    LYMPHOCYTES 49 21 - 52 %    MONOCYTES 6 3 - 10 %    EOSINOPHILS 2 0 - 5 %    BASOPHILS 1 0 - 2 %    ABS. NEUTROPHILS 2.0 1.8 - 8.0 K/UL    ABS. LYMPHOCYTES 2.3 0.9 - 3.6 K/UL    ABS. MONOCYTES 0.3 0.05 - 1.2 K/UL    ABS. EOSINOPHILS 0.1 0.0 - 0.4 K/UL    ABS. BASOPHILS 0.0 0.0 - 0.1 K/UL    DF AUTOMATED     METABOLIC PANEL, COMPREHENSIVE    Collection Time: 07/26/21  1:11 PM   Result Value Ref Range    Sodium 140 136 - 145 mmol/L    Potassium 4.4 3.5 - 5.5 mmol/L    Chloride 110 100 - 111 mmol/L    CO2 27 21 - 32 mmol/L    Anion gap 3 3.0 - 18 mmol/L    Glucose 142 (H) 74 - 99 mg/dL    BUN 18 7.0 - 18 MG/DL    Creatinine 0.68 0.6 - 1.3 MG/DL    BUN/Creatinine ratio 26 (H) 12 - 20      GFR est AA >60 >60 ml/min/1.73m2    GFR est non-AA >60 >60 ml/min/1.73m2    Calcium 9.0 8.5 - 10.1 MG/DL    Bilirubin, total 0.4 0.2 - 1.0 MG/DL    ALT (SGPT) 24 13 - 56 U/L    AST (SGOT) 20 10 - 38 U/L    Alk.  phosphatase 54 45 - 117 U/L    Protein, total 6.7 6.4 - 8.2 g/dL    Albumin 3.6 3.4 - 5.0 g/dL    Globulin 3.1 2.0 - 4.0 g/dL    A-G Ratio 1.2 0.8 - 1.7     CARDIAC PANEL,(CK, CKMB & TROPONIN)    Collection Time: 07/26/21  1:11 PM   Result Value Ref Range    CK - MB <1.0 <3.6 ng/ml    CK-MB Index  0.0 - 4.0 %     CALCULATION NOT PERFORMED WHEN RESULT IS BELOW LINEAR LIMIT     26 - 192 U/L    Troponin-I, QT <0.02 0.0 - 0.045 NG/ML   PROTHROMBIN TIME + INR    Collection Time: 07/26/21  1:11 PM   Result Value Ref Range    Prothrombin time 13.4 11.5 - 15.2 sec    INR 1.1 0.8 - 1.2     PTT    Collection Time: 07/26/21  1:11 PM   Result Value Ref Range    aPTT 37.2 (H) 23.0 - 36.4 SEC   DRUG SCREEN, URINE    Collection Time: 07/26/21  4:15 PM   Result Value Ref Range    BENZODIAZEPINES Negative NEG      BARBITURATES Negative NEG      THC (TH-CANNABINOL) Positive (A) NEG      OPIATES Negative NEG      PCP(PHENCYCLIDINE) Negative NEG      COCAINE Negative NEG      AMPHETAMINES Negative NEG      METHADONE Negative NEG      HDSCOM (NOTE)    METABOLIC PANEL, BASIC    Collection Time: 07/27/21  4:55 AM   Result Value Ref Range    Sodium 141 136 - 145 mmol/L    Potassium 3.8 3.5 - 5.5 mmol/L    Chloride 109 100 - 111 mmol/L    CO2 27 21 - 32 mmol/L    Anion gap 5 3.0 - 18 mmol/L    Glucose 82 74 - 99 mg/dL    BUN 9 7.0 - 18 MG/DL    Creatinine 0.52 (L) 0.6 - 1.3 MG/DL    BUN/Creatinine ratio 17 12 - 20      GFR est AA >60 >60 ml/min/1.73m2    GFR est non-AA >60 >60 ml/min/1.73m2    Calcium 8.7 8.5 - 10.1 MG/DL   CBC W/O DIFF    Collection Time: 07/27/21  4:55 AM   Result Value Ref Range    WBC 6.2 4.6 - 13.2 K/uL    RBC 3.65 (L) 4.20 - 5.30 M/uL    HGB 11.1 (L) 12.0 - 16.0 g/dL    HCT 34.7 (L) 35.0 - 45.0 %    MCV 95.1 74.0 - 97.0 FL    MCH 30.4 24.0 - 34.0 PG    MCHC 32.0 31.0 - 37.0 g/dL    RDW 12.0 11.6 - 14.5 %    PLATELET 443 168 - 506 K/uL    MPV 9.9 9.2 - 11.8 FL         Chemistry Recent Labs     07/27/21  0455 07/26/21  1311   GLU 82 142*    140   K 3.8 4.4    110   CO2 27 27   BUN 9 18   CREA 0.52* 0.68   CA 8.7 9.0   AGAP 5 3   BUCR 17 26*   AP  --  54   TP  --  6.7   ALB  --  3.6   GLOB  --  3.1   AGRAT  --  1.2        Lactic Acid No results found for: LAC  No results for input(s): LAC in the last 72 hours. Liver Enzymes Protein, total   Date Value Ref Range Status   07/26/2021 6.7 6.4 - 8.2 g/dL Final     Albumin   Date Value Ref Range Status   07/26/2021 3.6 3.4 - 5.0 g/dL Final     Globulin   Date Value Ref Range Status   07/26/2021 3.1 2.0 - 4.0 g/dL Final     A-G Ratio   Date Value Ref Range Status   07/26/2021 1.2 0.8 - 1.7   Final     Alk.  phosphatase   Date Value Ref Range Status   07/26/2021 54 45 - 117 U/L Final     Recent Labs     07/26/21  1311   TP 6.7   ALB 3.6   GLOB 3.1   AGRAT 1.2   AP 54        CBC w/Diff Recent Labs     07/27/21  0455 07/26/21  1311   WBC 6.2 4.8   RBC 3.65* 3.89*   HGB 11.1* 12.0   HCT 34.7* 36.9    237   GRANS  --  42   LYMPH  --  49   EOS  --  2        Cardiac Enzymes Lab Results   Component Value Date/Time     07/26/2021 01:11 PM    CKMB <1.0 07/26/2021 01:11 PM    CKND1  07/26/2021 01:11 PM     CALCULATION NOT PERFORMED WHEN RESULT IS BELOW LINEAR LIMIT    Loretta Romano <0.02 07/26/2021 01:11 PM        BNP No results found for: BNP, BNPP, XBNPT     Coagulation Recent Labs     07/26/21  1311   PTP 13.4   INR 1.1   APTT 37.2*         Thyroid  No results found for: T4, T3U, TSH, TSHEXT, TSHEXT    No results found for: T4     Urinalysis Lab Results   Component Value Date/Time    Color YELLOW 12/06/2014 02:28 AM    Appearance CLEAR 12/06/2014 02:28 AM    Specific gravity 1.020 12/06/2014 02:28 AM    pH (UA) 7.5 12/06/2014 02:28 AM    Protein NEGATIVE  12/06/2014 02:28 AM    Glucose NEGATIVE  12/06/2014 02:28 AM    Ketone NEGATIVE  12/06/2014 02:28 AM    Bilirubin NEGATIVE  12/06/2014 02:28 AM    Urobilinogen 0.2 12/06/2014 02:28 AM    Nitrites NEGATIVE  12/06/2014 02:28 AM    Leukocyte Esterase TRACE (A) 12/06/2014 02:28 AM    Epithelial cells 3+ 12/06/2014 02:28 AM    Bacteria 2+ (A) 12/06/2014 02:28 AM    WBC 3 to 5 12/06/2014 02:28 AM    RBC 0 to 3 12/06/2014 02:28 AM        Micro  No results for input(s): SDES, CULT in the last 72 hours. No results for input(s): CULT in the last 72 hours. Culture data during this hospitalization. All Micro Results     None               CXR reviewed by me:  XR (Most Recent). CXR  reviewed by me and compared with previous CXR Results from Hospital Encounter encounter on 07/26/21    XR CHEST PORT    Narrative  CHEST PA, LATERAL    Indication: Chest tube, pneumothorax. Comparison: X-ray 07/26/2021. Findings: Stable left inferior lateral pigtail pleural catheter. There is no  appreciable pneumothorax. Minimal streaky opacity in the left mid to upper lung  zones suggestive of subsegmental atelectasis or scarring. Remainder of the lung  parenchyma appears clear. The cardiac silhouette and pulmonary vascularity  appear within normal limits. Costophrenic angles are sharp. No evidence pleural  effusion. Impression  Stable left chest tube without appreciable pneumothorax. Suspect left midlung  subsegmental atelectasis or scarring. ·Please note: Voice-recognition software may have been used to generate this report, which may have resulted in some phonetic-based errors in grammar and contents. Even though attempts were made to correct all the mistakes, some may have been missed, and remained in the body of the document.       Hussein Hayes MD  7/27/2021

## 2021-07-27 NOTE — PROGRESS NOTES
1900 - Bedside and Verbal shift change report given to Neela Gibson RN (oncoming nurse) by Bruno Jacob RN (offgoing nurse). Report included the following information SBAR, Kardex, ED Summary, Procedure Summary, Intake/Output, MAR, Recent Results, Med Rec Status and Cardiac Rhythm NSR/SB. 2000 - Shift assessment completed. 0000 - Reassessment completed, no changes to previous assessment. 0400 - Reassessment competed, no changes to previous assessment. 0730 - Bedside and Verbal shift change report given to Isa Leahy RN (oncoming nurse) by Neela Gibson RN (offgoing nurse). Report included the following information SBAR, Kardex, ED Summary, Procedure Summary, Intake/Output, MAR, Recent Results, Med Rec Status and Cardiac Rhythm NSR.

## 2021-07-28 ENCOUNTER — APPOINTMENT (OUTPATIENT)
Dept: GENERAL RADIOLOGY | Age: 41
DRG: 201 | End: 2021-07-28
Attending: INTERNAL MEDICINE
Payer: COMMERCIAL

## 2021-07-28 LAB
ANION GAP SERPL CALC-SCNC: 2 MMOL/L (ref 3–18)
BUN SERPL-MCNC: 9 MG/DL (ref 7–18)
BUN/CREAT SERPL: 15 (ref 12–20)
CALCIUM SERPL-MCNC: 9.1 MG/DL (ref 8.5–10.1)
CHLORIDE SERPL-SCNC: 107 MMOL/L (ref 100–111)
CO2 SERPL-SCNC: 30 MMOL/L (ref 21–32)
CREAT SERPL-MCNC: 0.62 MG/DL (ref 0.6–1.3)
ERYTHROCYTE [DISTWIDTH] IN BLOOD BY AUTOMATED COUNT: 11.7 % (ref 11.6–14.5)
GLUCOSE SERPL-MCNC: 70 MG/DL (ref 74–99)
HCT VFR BLD AUTO: 37.4 % (ref 35–45)
HGB BLD-MCNC: 12 G/DL (ref 12–16)
MCH RBC QN AUTO: 30.5 PG (ref 24–34)
MCHC RBC AUTO-ENTMCNC: 32.1 G/DL (ref 31–37)
MCV RBC AUTO: 95.2 FL (ref 74–97)
PLATELET # BLD AUTO: 229 K/UL (ref 135–420)
PMV BLD AUTO: 9.9 FL (ref 9.2–11.8)
POTASSIUM SERPL-SCNC: 4.7 MMOL/L (ref 3.5–5.5)
RBC # BLD AUTO: 3.93 M/UL (ref 4.2–5.3)
SODIUM SERPL-SCNC: 139 MMOL/L (ref 136–145)
WBC # BLD AUTO: 4.7 K/UL (ref 4.6–13.2)

## 2021-07-28 PROCEDURE — 85027 COMPLETE CBC AUTOMATED: CPT

## 2021-07-28 PROCEDURE — 80048 BASIC METABOLIC PNL TOTAL CA: CPT

## 2021-07-28 PROCEDURE — 65270000029 HC RM PRIVATE

## 2021-07-28 PROCEDURE — 71045 X-RAY EXAM CHEST 1 VIEW: CPT

## 2021-07-28 PROCEDURE — 77010033678 HC OXYGEN DAILY

## 2021-07-28 PROCEDURE — 74011250637 HC RX REV CODE- 250/637: Performed by: HOSPITALIST

## 2021-07-28 PROCEDURE — 36415 COLL VENOUS BLD VENIPUNCTURE: CPT

## 2021-07-28 PROCEDURE — 74011250636 HC RX REV CODE- 250/636: Performed by: HOSPITALIST

## 2021-07-28 PROCEDURE — 74011250637 HC RX REV CODE- 250/637: Performed by: FAMILY MEDICINE

## 2021-07-28 RX ADMIN — OXYCODONE 10 MG: 5 TABLET ORAL at 04:49

## 2021-07-28 RX ADMIN — FAMOTIDINE 20 MG: 20 TABLET ORAL at 10:00

## 2021-07-28 RX ADMIN — ENOXAPARIN SODIUM 40 MG: 40 INJECTION SUBCUTANEOUS at 10:00

## 2021-07-28 RX ADMIN — FAMOTIDINE 20 MG: 20 TABLET ORAL at 21:32

## 2021-07-28 RX ADMIN — OXYCODONE 10 MG: 5 TABLET ORAL at 23:38

## 2021-07-28 NOTE — PROGRESS NOTES
Verbal report recieved from [facility]  (offgoing nurse) given to Trino Perry RN (oncoming nurse).  Report included the following information SBAR, Kardex, Intake/Output, MAR and Recent Results

## 2021-07-28 NOTE — PROGRESS NOTES
CXR immediately after removal of left chest tube showed questionable very tiny left apical pneumothorax. We will repeat CXR at 1600 p.m. Discussed with Dr. Gabriel Terry who will follow the CXR. Patient is on 3 LPM NC; discussed with patient to keep the O2 on. Results from Hospital Encounter encounter on 07/26/21    XR CHEST PORT    Narrative  EXAM: One-view chest    CLINICAL HISTORY: left PTX; s/p removal of chest tube. ,    COMPARISON: None    FINDINGS:    Frontal view of the chest demonstrate question of tiny left apical pneumothorax. Cardiac silhouette is normal in size and contour. No acute bony or soft tissue  abnormality. Impression  Question of very tiny left apical pneumothorax, stable.  Otherwise unremarkable    Veronika Hamm MD 7/28/2021 2:48 PM

## 2021-07-28 NOTE — PROGRESS NOTES
1900 - Bedside and Verbal shift change report given to Krysta Gooden RN (oncoming nurse) by Sung Ochoa RN (offgoing nurse). Report included the following information SBAR, Kardex, ED Summary, Procedure Summary, Intake/Output, MAR, Recent Results, Med Rec Status and Cardiac Rhythm NSR/SB. 2000 - Shift assessment completed. Patient alert and oriented. Lung sounds diminished. 2315 - Bedside and Verbal shift change report given to Andrew Cardenas RN (oncoming nurse) by Krysta Gooden RN (offgoing nurse). Report included the following information SBAR, Kardex, ED Summary, Procedure Summary, Intake/Output, MAR, Recent Results, Med Rec Status and Cardiac Rhythm NSR/SB.

## 2021-07-28 NOTE — PROGRESS NOTES
Verbal report given to Cindy Brito  by Susy Estrada RN for transfer of care. Report included the following information SBAR, Kardex, ED Summary, Intake/Output, MAR, Recent Results, Cardiac Rhythm Sinus dennis and Alarm Parameters . Pt was transferred via wheelchair @1830  to room 307 from .  Pt was hooked up to remote telemetry, VSS, no signs of distress with family at bedside and care resumed by Rut Gramajo RN

## 2021-07-28 NOTE — PROGRESS NOTES
Hospitalist Progress Note    Patient: Harsha Rowe MRN: [id number]  CSN: [id number]    YOB: 1980  Age: 41 y.o. Sex: female    DOA: 7/26/2021 LOS:  LOS: 2 days                Assessment/Plan     Patient Active Problem List   Diagnosis Code    Pneumothorax on left J93.9    Left-sided chest pain R07.9    Vapes non-nicotine containing substance Z72.89    Spontaneous pneumothorax J93.83    Cigarette nicotine dependence in remission F17.211            41 y.o. female with no significant past history presents to ER with concerns of left-sided chest pain. Admitted for left spontaneous pneumothorax. Left pneumothorax -  Chest tube removed today  CXR after chest tube removal showed tiny apical pneumothorax. Repeat CXR done today at 1600 showed progressive disease in a tiny left apical pneumothorax. Continue with oxygen  Saturating 100% on 3L  Discussed with Dr. Yelena Mercer      Prophylaxis - DVT: lovenox, scd, GI: pepcid. Disposition : 1-2 days    Review of systems  General: No fevers or chills. Cardiovascular: No chest pain or pressure. No palpitations. Pulmonary: No shortness of breath. Gastrointestinal: No nausea, vomiting. Physical Exam:  General: Awake, cooperative, no acute distress    HEENT: NC, Atraumatic. PERRLA, anicteric sclerae. Lungs: CTA Bilaterally. No Wheezing/Rhonchi/Rales. Heart:  S1 S2,  No murmur, No Rubs, No Gallops  Abdomen: Soft, Non distended, Non tender.  +Bowel sounds,   Extremities: No c/c/e  Psych:   Not anxious or agitated. Neurologic:  No acute neurological deficit. Vital signs/Intake and Output:  Visit Vitals  BP (!) 138/41 (BP 1 Location: Left upper arm, BP Patient Position: At rest)   Pulse 76   Temp 99.3 °F (37.4 °C)   Resp 16   Ht 5' 4\" (1.626 m)   Wt 61.7 kg (136 lb)   SpO2 94%   BMI 23.34 kg/m²     Current Shift:  No intake/output data recorded.   Last three shifts:  07/27 0701 - 07/28 1900  In: 3963 [P.O.:1680]  Out: 2150 [JBJDN:2082]            Labs: Results:       Chemistry Recent Labs     07/28/21 0425 07/27/21  0455 07/26/21  1311   GLU 70* 82 142*    141 140   K 4.7 3.8 4.4    109 110   CO2 30 27 27   BUN 9 9 18   CREA 0.62 0.52* 0.68   CA 9.1 8.7 9.0   AGAP 2* 5 3   BUCR 15 17 26*   AP  --   --  54   TP  --   --  6.7   ALB  --   --  3.6   GLOB  --   --  3.1   AGRAT  --   --  1.2      CBC w/Diff Recent Labs     07/28/21 0425 07/27/21  0455 07/26/21  1311   WBC 4.7 6.2 4.8   RBC 3.93* 3.65* 3.89*   HGB 12.0 11.1* 12.0   HCT 37.4 34.7* 36.9    226 237   GRANS  --   --  42   LYMPH  --   --  49   EOS  --   --  2      Cardiac Enzymes Recent Labs     07/26/21  1311      CKND1 CALCULATION NOT PERFORMED WHEN RESULT IS BELOW LINEAR LIMIT      Coagulation Recent Labs     07/26/21  1311   PTP 13.4   INR 1.1   APTT 37.2*       Lipid Panel No results found for: CHOL, CHOLPOCT, CHOLX, CHLST, CHOLV, 950461, HDL, HDLP, LDL, LDLC, DLDLP, 157709, VLDLC, VLDL, TGLX, TRIGL, TRIGP, TGLPOCT, CHHD, CHHDX   BNP No results for input(s): BNPP in the last 72 hours.    Liver Enzymes Recent Labs     07/26/21  1311   TP 6.7   ALB 3.6   AP 54      Thyroid Studies No results found for: T4, T3U, TSH, TSHEXT, TSHEXT     Procedures/imaging: see electronic medical records for all procedures/Xrays and details which were not copied into this note but were reviewed prior to creation of Plan

## 2021-07-28 NOTE — PROGRESS NOTES
Bedside and verbal shift change report given to Ann Fishman RN (oncoming nurse) by Aris Sheth RN (offgoing nurse).  Report included the following information SBAR, Kardex, Procedure Summary, Intake/Output, MAR and Recent Results

## 2021-07-28 NOTE — PROGRESS NOTES
Pulmonary Specialists  Pulmonary, Critical Care, and Sleep Medicine    Name: Severo Labella MRN: [id number]   : 1980 Hospital: [facility] [place]    Date: 2021  Room: [address] [place] [address] Note                                              Consult requesting physician: Dr. Elisha Vásquez  Reason for Consult: Pneumothorax      IMPRESSION:   ·   ·   Patient Active Problem List   Diagnosis Code    Pneumothorax on left J93.9    Left-sided chest pain R07.9    Vapes non-nicotine containing substance Z72.89    Spontaneous pneumothorax J93.83    Cigarette nicotine dependence in remission F17.211       · Code status: Full code      RECOMMENDATIONS:   Respiratory: Primary spontaneous left pneumothorax; s/p pigtail chest tube placed 2021 with resolved pneumothorax; pigtail chest tube removed 2021. Hx former cigarette smoker; quit about 2 years ago in 2019; since then started nicotine vaping. On 2021, sudden onset of left-sided sharp stabbing chest pain while lying down and not being active. Urgent care visit on 2021; CXR was performed, initially reported left pericardial likely pneumonia; discharged home on amoxicillin and indomethacin; patient received phone call on 2021 with CXR final report showing left pneumothorax and sent to ER. Repeat CXR 2021 at THE [facility] ER showed large left PTX without mediastinal shift. Patient on room air with SPO2 98%; not in respiratory distress. S/p IR guided left pigtail chest tube placed 2021 which improved pneumothorax on 2021; resolved pneumothorax since 2021 on multiple CXR. Chest tube remained clamped since 2021 afternoon. CXR 2021 reviewed: Left pigtail catheter in place; no pneumothorax. Left pigtail catheter removed by me 2021. After removal of catheter, no chest pain or dyspnea. Repeat CXR in 2 hours ordered. If CXR remains stable, then will repeat in 6 to 8 hours.   Patient is on room air with SPO2 100%. Advised to keep O2 on for now. Strongly advised and encouraged to stop using marijuana and nicotine vaping. Keep SPO2 >=92%. HOB 30 degree elevation all the time. Aggressive pulmonary toileting. Aspiration precautions. Incentive spirometry. CVS: Hemodynamically stable. Monitor. ID: No acute issues. Hematology/Oncology: Normal hemoglobin, platelets, coags. Monitor. Renal: Normal creatinine. No acute issues. GI/: No acute issues. Endocrine: Monitor BS. Neurology: Alert awake oriented x3. No focal deficit. No acute issues. Toxicology: Urine drug screen positive for THC; advised to stop using marijuana. Pain/Sedation: Judicious use of pain medication. Skin/Wound: Continue dressing at pigtail chest tube catheter site. Electrolytes: Replace electrolytes per ICU electrolyte replacement protocol. IVF: None  Nutrition: P.o. Prophylaxis: DVT Prophylaxis: SCD/Lovenox. GI Prophylaxis: Pepcid. Restraints: none  Lines/Tubes: PIV  Left pigtail chest tube: 7/26/2021 by IR. Will defer respective systems problem management to primary and other respective consultant and follow patient in ICU with primary and other medical team.  Further recommendations will be based on the patient's response to recommended treatment and results of the investigation ordered. Quality Care: PPI, DVT prophylaxis, HOB elevated, Infection control all reviewed and addressed. Care of plan d/w hospitalist team, RN, RT, MDR. High complexity decision making was performed during the evaluation of this patient at high risk for decompensation with multiple organ involvement. Total critical care time spent rendering care exclusive of procedures/family discussion/coordination of care: 33 minutes.          Subjective/History of Present Illness:     Patient is a 41 y.o. female with PMHx significant for former cigarette smoker quit 2019, nicotine vapor since 2019; admitted with primary spontaneous left pneumothorax. Hx former cigarette smoker; quit about 2 years ago in 2019; since then started nicotine vaping. On 7/24/2021, sudden onset of left-sided sharp stabbing chest pain while lying down and not being active. Urgent care visit on 7/25/2021; CXR was performed, initially reported left pericardial likely pneumonia; discharged home on amoxicillin and indomethacin; patient received phone call on 7/26/2021 with CXR final report showing left pneumothorax and sent to ER. Repeat CXR 7/26/2021 at [facility] ER showed large left PTX without mediastinal shift. Patient on room air with SPO2 98%; not in respiratory distress. S/p left pigtail chest tube placed 7/26/2021.    7/28/2021:  Remains in ICU room 101. Left pigtail chest tube was clamped since yesterday afternoon. Repeat CXR 7/28/2021 reviewed: No pneumothorax. Pigtail catheter in place. Denies dyspnea, cough, chest pain, wheezing. Hemodynamically stable. No fever. Left pigtail chest tube removed by me. We will repeat CXR in 2 hours. [facility] was not called for any issues overnight. No other overnight issues reported. I/O last 24 hrs: Intake/Output Summary (Last 24 hours) at 7/28/2021 1040  Last data filed at 7/28/2021 0449  Gross per 24 hour   Intake 480 ml   Output 1350 ml   Net -870 ml       History taken from patient, EMR     Review of Systems:   HEENT: No epistaxis, no nasal drainage, no difficulty in swallowing, no redness in eyes  Respiratory: as above  Cardiovascular: no chest pain, no palpitations, no chronic leg edema, no syncope  Gastrointestinal: no abd pain, no vomiting, no diarrhea, no bleeding symptoms  Genitourinary: No urinary symptoms or hematuria  Musculoskeletal: Neg  Neurological: No focal weakness, no seizures, no headaches  Behvioral/Psych: No anxiety, no depression  Constitutional: No fever, no chills, no weight loss, no night sweats     No Known Allergies   History reviewed. No pertinent past medical history.    Past Surgical History:   Procedure Laterality Date    HX CHOLECYSTECTOMY      IR DRAIN CHEST  2021      Social History     Tobacco Use    Smoking status: Never Smoker    Smokeless tobacco: Current User   Substance Use Topics    Alcohol use: Not on file      History reviewed. No pertinent family history. Prior to Admission medications    Not on File     Current Facility-Administered Medications   Medication Dose Route Frequency    enoxaparin (LOVENOX) injection 40 mg  40 mg SubCUTAneous Q24H    famotidine (PEPCID) tablet 20 mg  20 mg Oral BID         Objective:   Vital Signs:    Visit Vitals  BP 98/63   Pulse 62   Temp 98.6 °F (37 °C)   Resp 29   Ht 5' 4\" (1.626 m)   Wt 61.7 kg (136 lb)   SpO2 97%   BMI 23.34 kg/m²       O2 Device: None (Room air)   O2 Flow Rate (L/min): 4 l/min   Temp (24hrs), Av.7 °F (37.1 °C), Min:98.3 °F (36.8 °C), Max:99 °F (37.2 °C)       Intake/Output:   Last shift:      No intake/output data recorded. Last 3 shifts:  1901 -  0700  In: 840 [P.O.:840]  Out: 2482 [Urine:2450]      Intake/Output Summary (Last 24 hours) at 2021 1040  Last data filed at 2021 0449  Gross per 24 hour   Intake 480 ml   Output 1350 ml   Net -870 ml       Last 3 Recorded Weights in this Encounter    21 1308   Weight: 61.7 kg (136 lb)       Physical Exam:     General/Neurology: Alert, awake and oriented. NAD. Head:   NCAT. Eye:   EOM intact. No icterus/pallor/cyanosis. Nose:   No nasal drainage/discharge. Neck:   Trachea midline. Lung: Moderate air entry bilateral equal.  No rales, rhonchi. No wheezing or stridor. No prolonged expiration or accessory muscle use. Heart:   S1 S2 present. No murmur or JVD. Abdomen:  Soft. NT. ND. No palpable masses. Extremities:  No edema. No cyanosis or clubbing. Pulses: 2+ and symmetric in DP.       Data:       Recent Results (from the past 24 hour(s))   METABOLIC PANEL, BASIC    Collection Time: 21  4:25 AM   Result Value Ref Range Sodium 139 136 - 145 mmol/L    Potassium 4.7 3.5 - 5.5 mmol/L    Chloride 107 100 - 111 mmol/L    CO2 30 21 - 32 mmol/L    Anion gap 2 (L) 3.0 - 18 mmol/L    Glucose 70 (L) 74 - 99 mg/dL    BUN 9 7.0 - 18 MG/DL    Creatinine 0.62 0.6 - 1.3 MG/DL    BUN/Creatinine ratio 15 12 - 20      GFR est AA >60 >60 ml/min/1.73m2    GFR est non-AA >60 >60 ml/min/1.73m2    Calcium 9.1 8.5 - 10.1 MG/DL   CBC W/O DIFF    Collection Time: 07/28/21  4:25 AM   Result Value Ref Range    WBC 4.7 4.6 - 13.2 K/uL    RBC 3.93 (L) 4.20 - 5.30 M/uL    HGB 12.0 12.0 - 16.0 g/dL    HCT 37.4 35.0 - 45.0 %    MCV 95.2 74.0 - 97.0 FL    MCH 30.5 24.0 - 34.0 PG    MCHC 32.1 31.0 - 37.0 g/dL    RDW 11.7 11.6 - 14.5 %    PLATELET 350 927 - 395 K/uL    MPV 9.9 9.2 - 11.8 FL         Chemistry Recent Labs     07/28/21  0425 07/27/21  0455 07/26/21  1311   GLU 70* 82 142*    141 140   K 4.7 3.8 4.4    109 110   CO2 30 27 27   BUN 9 9 18   CREA 0.62 0.52* 0.68   CA 9.1 8.7 9.0   AGAP 2* 5 3   BUCR 15 17 26*   AP  --   --  54   TP  --   --  6.7   ALB  --   --  3.6   GLOB  --   --  3.1   AGRAT  --   --  1.2        Lactic Acid No results found for: LAC  No results for input(s): LAC in the last 72 hours. Liver Enzymes Protein, total   Date Value Ref Range Status   07/26/2021 6.7 6.4 - 8.2 g/dL Final     Albumin   Date Value Ref Range Status   07/26/2021 3.6 3.4 - 5.0 g/dL Final     Globulin   Date Value Ref Range Status   07/26/2021 3.1 2.0 - 4.0 g/dL Final     A-G Ratio   Date Value Ref Range Status   07/26/2021 1.2 0.8 - 1.7   Final     Alk.  phosphatase   Date Value Ref Range Status   07/26/2021 54 45 - 117 U/L Final     Recent Labs     07/26/21  1311   TP 6.7   ALB 3.6   GLOB 3.1   AGRAT 1.2   AP 54        CBC w/Diff Recent Labs     07/28/21  0425 07/27/21  0455 07/26/21  1311   WBC 4.7 6.2 4.8   RBC 3.93* 3.65* 3.89*   HGB 12.0 11.1* 12.0   HCT 37.4 34.7* 36.9    226 237   GRANS  --   --  42   LYMPH  --   --  49   EOS  --   -- 2        Cardiac Enzymes No results found for: CPK, CK, CKMMB, CKMB, RCK3, CKMBT, CKNDX, CKND1, KRISSY, TROPT, TROIQ, NIKKI, TROPT, TNIPOC, BNP, BNPP     BNP No results found for: BNP, BNPP, XBNPT     Coagulation Recent Labs     07/26/21  1311   PTP 13.4   INR 1.1   APTT 37.2*         Thyroid  No results found for: T4, T3U, TSH, TSHEXT, TSHEXT    No results found for: T4     Urinalysis Lab Results   Component Value Date/Time    Color YELLOW 12/06/2014 02:28 AM    Appearance CLEAR 12/06/2014 02:28 AM    Specific gravity 1.020 12/06/2014 02:28 AM    pH (UA) 7.5 12/06/2014 02:28 AM    Protein NEGATIVE  12/06/2014 02:28 AM    Glucose NEGATIVE  12/06/2014 02:28 AM    Ketone NEGATIVE  12/06/2014 02:28 AM    Bilirubin NEGATIVE  12/06/2014 02:28 AM    Urobilinogen 0.2 12/06/2014 02:28 AM    Nitrites NEGATIVE  12/06/2014 02:28 AM    Leukocyte Esterase TRACE (A) 12/06/2014 02:28 AM    Epithelial cells 3+ 12/06/2014 02:28 AM    Bacteria 2+ (A) 12/06/2014 02:28 AM    WBC 3 to 5 12/06/2014 02:28 AM    RBC 0 to 3 12/06/2014 02:28 AM        Micro  No results for input(s): SDES, CULT in the last 72 hours. No results for input(s): CULT in the last 72 hours. Culture data during this hospitalization. All Micro Results     None               CXR reviewed by me:  XR (Most Recent). CXR  reviewed by me and compared with previous CXR Results from Hospital Encounter encounter on 07/26/21    XR CHEST PORT    Narrative  EXAM: One-view chest    CLINICAL HISTORY: Pneumothorax ,    COMPARISON: None    FINDINGS:    Frontal view of the chest demonstrate clear lungs. No pneumothorax identified. Left basilar chest tube in stable position. Cardiac silhouette is normal in size  and contour. No acute bony or soft tissue abnormality. Impression  No pneumothorax identified. Left-sided chest tube in stable position.          ·Please note: Voice-recognition software may have been used to generate this report, which may have resulted in some phonetic-based errors in grammar and contents. Even though attempts were made to correct all the mistakes, some may have been missed, and remained in the body of the document.       Hussein Hayes MD  7/28/2021

## 2021-07-29 ENCOUNTER — APPOINTMENT (OUTPATIENT)
Dept: GENERAL RADIOLOGY | Age: 41
DRG: 201 | End: 2021-07-29
Attending: INTERNAL MEDICINE
Payer: COMMERCIAL

## 2021-07-29 VITALS
SYSTOLIC BLOOD PRESSURE: 159 MMHG | DIASTOLIC BLOOD PRESSURE: 80 MMHG | BODY MASS INDEX: 24.45 KG/M2 | HEIGHT: 64 IN | OXYGEN SATURATION: 98 % | WEIGHT: 143.2 LBS | TEMPERATURE: 97.6 F | RESPIRATION RATE: 16 BRPM | HEART RATE: 66 BPM

## 2021-07-29 PROBLEM — F12.90 MARIJUANA USE: Status: ACTIVE | Noted: 2021-07-29

## 2021-07-29 LAB
ANION GAP SERPL CALC-SCNC: 5 MMOL/L (ref 3–18)
BUN SERPL-MCNC: 13 MG/DL (ref 7–18)
BUN/CREAT SERPL: 22 (ref 12–20)
CALCIUM SERPL-MCNC: 9.1 MG/DL (ref 8.5–10.1)
CHLORIDE SERPL-SCNC: 106 MMOL/L (ref 100–111)
CO2 SERPL-SCNC: 29 MMOL/L (ref 21–32)
CREAT SERPL-MCNC: 0.58 MG/DL (ref 0.6–1.3)
ERYTHROCYTE [DISTWIDTH] IN BLOOD BY AUTOMATED COUNT: 11.7 % (ref 11.6–14.5)
GLUCOSE SERPL-MCNC: 75 MG/DL (ref 74–99)
HCT VFR BLD AUTO: 37.3 % (ref 35–45)
HGB BLD-MCNC: 11.9 G/DL (ref 12–16)
MCH RBC QN AUTO: 30 PG (ref 24–34)
MCHC RBC AUTO-ENTMCNC: 31.9 G/DL (ref 31–37)
MCV RBC AUTO: 94 FL (ref 74–97)
PLATELET # BLD AUTO: 240 K/UL (ref 135–420)
PMV BLD AUTO: 10.1 FL (ref 9.2–11.8)
POTASSIUM SERPL-SCNC: 4 MMOL/L (ref 3.5–5.5)
RBC # BLD AUTO: 3.97 M/UL (ref 4.2–5.3)
SODIUM SERPL-SCNC: 140 MMOL/L (ref 136–145)
WBC # BLD AUTO: 4.5 K/UL (ref 4.6–13.2)

## 2021-07-29 PROCEDURE — 36415 COLL VENOUS BLD VENIPUNCTURE: CPT

## 2021-07-29 PROCEDURE — 85027 COMPLETE CBC AUTOMATED: CPT

## 2021-07-29 PROCEDURE — 77010033678 HC OXYGEN DAILY

## 2021-07-29 PROCEDURE — 74011250637 HC RX REV CODE- 250/637: Performed by: HOSPITALIST

## 2021-07-29 PROCEDURE — 71045 X-RAY EXAM CHEST 1 VIEW: CPT

## 2021-07-29 PROCEDURE — 80048 BASIC METABOLIC PNL TOTAL CA: CPT

## 2021-07-29 PROCEDURE — 74011250636 HC RX REV CODE- 250/636: Performed by: HOSPITALIST

## 2021-07-29 RX ADMIN — ENOXAPARIN SODIUM 40 MG: 40 INJECTION SUBCUTANEOUS at 08:45

## 2021-07-29 RX ADMIN — FAMOTIDINE 20 MG: 20 TABLET ORAL at 08:45

## 2021-07-29 NOTE — PROGRESS NOTES
AVS reviewed with patient and any opportunities and questions or concerns at this time. D/C'd IV site, cath clean dry and intact. Properly discarded armbands. Resp even and non-labored. No s/sresp distress noted. Awaiting transport.

## 2021-07-29 NOTE — PROGRESS NOTES
Pulmonary Specialists  Pulmonary, Critical Care, and Sleep Medicine    Name: Jenelle North MRN: [id number]   : 1980 Hospital: [facility] [place]    Date: 2021  Room: [facility]/[address] [place] [address] Note                                              Consult requesting physician: Dr. Lisa Merida  Reason for Consult: Pneumothorax      IMPRESSION:   ·   ·   Patient Active Problem List   Diagnosis Code    Pneumothorax on left J93.9    Left-sided chest pain R07.9    Vapes non-nicotine containing substance Z72.89    Spontaneous pneumothorax J93.83    Cigarette nicotine dependence in remission F17.211    Marijuana use F12.90       · Code status: Full code      RECOMMENDATIONS:   Primary spontaneous left pneumothorax; s/p pigtail chest tube placed 2021 with resolved pneumothorax; pigtail chest tube removed 2021; no recurrence of pneumothorax on CXR 2021. Hx former cigarette smoker; quit about 2 years ago in 2019; since then started nicotine vaping. Hx marijuana use. Strongly advised and encouraged to stop using marijuana and nicotine vaping. On room air 100%. Using incentive spirometer. Advised not to do strenuous activities for next couple weeks. Keep SPO2 >=92%. HOB 30 degree elevation all the time. Aggressive pulmonary toileting. Aspiration precautions. Incentive spirometry. She has not received COVID-19 vaccine yet; discussed at length and advised and educated to get vaccine. She agreed; she will get vaccine as soon as discharged from the hospital.  She agreed for outpatient follow-up with me for PFT, CT etc.  Follow-up with me in office in 4 to 6 weeks. Will defer respective systems problem management to primary and other respective consultant. Care of plan d/w patient, Dr. Aminta Marie. Moderate complexity decision making was performed during the evaluation of this patient at high risk for decompensation with multiple organ involvement.     Okay to discharge home from pulmonary perspective. Follow-up with me in office in 4 to 6 weeks. Subjective/History of Present Illness:     Patient is a 41 y.o. female with PMHx significant for former cigarette smoker quit 2019, nicotine vapor since 2019; admitted with primary spontaneous left pneumothorax. Hx former cigarette smoker; quit about 2 years ago in 2019; since then started nicotine vaping. On 7/24/2021, sudden onset of left-sided sharp stabbing chest pain while lying down and not being active. Urgent care visit on 7/25/2021; CXR was performed, initially reported left pericardial likely pneumonia; discharged home on amoxicillin and indomethacin; patient received phone call on 7/26/2021 with CXR final report showing left pneumothorax and sent to ER. Repeat CXR 7/26/2021 at [facility] ER showed large left PTX without mediastinal shift. Patient on room air with SPO2 98%; not in respiratory distress. S/p left pigtail chest tube placed 7/26/2021.    7/29/2021:  Transfer out of ICU on 7/28/2021. Seen in room 307. On room air resting. Sitting in bed. Anxious to leave the hospital.  CXR 7/29/2021: No recurrence of pneumothorax. Denies fever, cough, dyspnea, chest pain, wheezing, lightheadedness, dizziness. Hemodynamically stable. No other overnight pulmonary issues reported. I/O last 24 hrs:      Intake/Output Summary (Last 24 hours) at 7/29/2021 1204  Last data filed at 7/29/2021 0023  Gross per 24 hour   Intake 840 ml   Output 500 ml   Net 340 ml       History taken from patient, EMR     Review of Systems:   HEENT: No epistaxis, no nasal drainage, no difficulty in swallowing, no redness in eyes  Respiratory: as above  Cardiovascular: no chest pain, no palpitations, no chronic leg edema, no syncope  Gastrointestinal: no abd pain, no vomiting, no diarrhea, no bleeding symptoms  Genitourinary: No urinary symptoms or hematuria  Musculoskeletal: Neg  Neurological: No focal weakness, no seizures, no headaches  Behvioral/Psych: No anxiety, no depression  Constitutional: No fever, no chills, no weight loss, no night sweats     No Known Allergies   History reviewed. No pertinent past medical history. Past Surgical History:   Procedure Laterality Date    HX CHOLECYSTECTOMY      IR DRAIN CHEST  2021      Social History     Tobacco Use    Smoking status: Never Smoker    Smokeless tobacco: Current User   Substance Use Topics    Alcohol use: Not on file      History reviewed. No pertinent family history. Prior to Admission medications    Not on File     Current Facility-Administered Medications   Medication Dose Route Frequency    enoxaparin (LOVENOX) injection 40 mg  40 mg SubCUTAneous Q24H    famotidine (PEPCID) tablet 20 mg  20 mg Oral BID         Objective:   Vital Signs:    Visit Vitals  BP (!) 159/80 (BP 1 Location: Right arm, BP Patient Position: At rest)   Pulse 66   Temp 97.6 °F (36.4 °C)   Resp 16   Ht 5' 4\" (1.626 m)   Wt 65 kg (143 lb 3.2 oz)   SpO2 98%   BMI 24.58 kg/m²       O2 Device: Nasal cannula   O2 Flow Rate (L/min): 2 l/min   Temp (24hrs), Av.6 °F (37 °C), Min:97.6 °F (36.4 °C), Max:99.3 °F (37.4 °C)       Intake/Output:   Last shift:      No intake/output data recorded. Last 3 shifts:  1901 -  0700  In: 840 [P.O.:840]  Out: 1700 [Urine:1700]      Intake/Output Summary (Last 24 hours) at 2021 1204  Last data filed at 2021 0023  Gross per 24 hour   Intake 840 ml   Output 500 ml   Net 340 ml       Last 3 Recorded Weights in this Encounter    21 1308 21 0019   Weight: 61.7 kg (136 lb) 65 kg (143 lb 3.2 oz)       Physical Exam:     General/Neurology: Alert, awake and oriented. NAD. Head:   NCAT. Eye:   EOM intact. No icterus/pallor/cyanosis. Nose:   No nasal drainage/discharge. Neck:   Trachea midline. Lung: Moderate air entry bilateral equal.  No rales, rhonchi. No wheezing or stridor.   No prolonged expiration or accessory muscle use.  Heart:   S1 S2 present. No murmur or JVD. Abdomen:  Soft. NT. ND. No palpable masses. Extremities:  No edema. No cyanosis or clubbing. Pulses: 2+ and symmetric in DP. Data:       Recent Results (from the past 24 hour(s))   METABOLIC PANEL, BASIC    Collection Time: 07/29/21  2:25 AM   Result Value Ref Range    Sodium 140 136 - 145 mmol/L    Potassium 4.0 3.5 - 5.5 mmol/L    Chloride 106 100 - 111 mmol/L    CO2 29 21 - 32 mmol/L    Anion gap 5 3.0 - 18 mmol/L    Glucose 75 74 - 99 mg/dL    BUN 13 7.0 - 18 MG/DL    Creatinine 0.58 (L) 0.6 - 1.3 MG/DL    BUN/Creatinine ratio 22 (H) 12 - 20      GFR est AA >60 >60 ml/min/1.73m2    GFR est non-AA >60 >60 ml/min/1.73m2    Calcium 9.1 8.5 - 10.1 MG/DL   CBC W/O DIFF    Collection Time: 07/29/21  2:25 AM   Result Value Ref Range    WBC 4.5 (L) 4.6 - 13.2 K/uL    RBC 3.97 (L) 4.20 - 5.30 M/uL    HGB 11.9 (L) 12.0 - 16.0 g/dL    HCT 37.3 35.0 - 45.0 %    MCV 94.0 74.0 - 97.0 FL    MCH 30.0 24.0 - 34.0 PG    MCHC 31.9 31.0 - 37.0 g/dL    RDW 11.7 11.6 - 14.5 %    PLATELET 315 515 - 034 K/uL    MPV 10.1 9.2 - 11.8 FL         Chemistry Recent Labs     07/29/21  0225 07/28/21  0425 07/27/21  0455 07/26/21  1311 07/26/21  1311   GLU 75 70* 82   < > 142*    139 141   < > 140   K 4.0 4.7 3.8   < > 4.4    107 109   < > 110   CO2 29 30 27   < > 27   BUN 13 9 9   < > 18   CREA 0.58* 0.62 0.52*   < > 0.68   CA 9.1 9.1 8.7   < > 9.0   AGAP 5 2* 5   < > 3   BUCR 22* 15 17   < > 26*   AP  --   --   --   --  54   TP  --   --   --   --  6.7   ALB  --   --   --   --  3.6   GLOB  --   --   --   --  3.1   AGRAT  --   --   --   --  1.2    < > = values in this interval not displayed. Lactic Acid No results found for: LAC  No results for input(s): LAC in the last 72 hours.      Liver Enzymes Protein, total   Date Value Ref Range Status   07/26/2021 6.7 6.4 - 8.2 g/dL Final     Albumin   Date Value Ref Range Status   07/26/2021 3.6 3.4 - 5.0 g/dL Final Globulin   Date Value Ref Range Status   07/26/2021 3.1 2.0 - 4.0 g/dL Final     A-G Ratio   Date Value Ref Range Status   07/26/2021 1.2 0.8 - 1.7   Final     Alk. phosphatase   Date Value Ref Range Status   07/26/2021 54 45 - 117 U/L Final     Recent Labs     07/26/21  1311   TP 6.7   ALB 3.6   GLOB 3.1   AGRAT 1.2   AP 54        CBC w/Diff Recent Labs     07/29/21  0225 07/28/21  0425 07/27/21  0455 07/26/21  1311 07/26/21  1311   WBC 4.5* 4.7 6.2   < > 4.8   RBC 3.97* 3.93* 3.65*   < > 3.89*   HGB 11.9* 12.0 11.1*   < > 12.0   HCT 37.3 37.4 34.7*   < > 36.9    229 226   < > 237   GRANS  --   --   --   --  42   LYMPH  --   --   --   --  49   EOS  --   --   --   --  2    < > = values in this interval not displayed. Cardiac Enzymes No results found for: CPK, CK, CKMMB, CKMB, RCK3, CKMBT, CKNDX, CKND1, KRISSY, TROPT, TROIQ, NIKKI, TROPT, TNIPOC, BNP, BNPP     BNP No results found for: BNP, BNPP, XBNPT     Coagulation Recent Labs     07/26/21  1311   PTP 13.4   INR 1.1   APTT 37.2*         Thyroid  No results found for: T4, T3U, TSH, TSHEXT, TSHEXT    No results found for: T4     Urinalysis Lab Results   Component Value Date/Time    Color YELLOW 12/06/2014 02:28 AM    Appearance CLEAR 12/06/2014 02:28 AM    Specific gravity 1.020 12/06/2014 02:28 AM    pH (UA) 7.5 12/06/2014 02:28 AM    Protein NEGATIVE  12/06/2014 02:28 AM    Glucose NEGATIVE  12/06/2014 02:28 AM    Ketone NEGATIVE  12/06/2014 02:28 AM    Bilirubin NEGATIVE  12/06/2014 02:28 AM    Urobilinogen 0.2 12/06/2014 02:28 AM    Nitrites NEGATIVE  12/06/2014 02:28 AM    Leukocyte Esterase TRACE (A) 12/06/2014 02:28 AM    Epithelial cells 3+ 12/06/2014 02:28 AM    Bacteria 2+ (A) 12/06/2014 02:28 AM    WBC 3 to 5 12/06/2014 02:28 AM    RBC 0 to 3 12/06/2014 02:28 AM        Micro  No results for input(s): SDES, CULT in the last 72 hours. No results for input(s): CULT in the last 72 hours. Culture data during this hospitalization.    All Micro Results     None               CXR reviewed by me:  XR (Most Recent). CXR  reviewed by me and compared with previous CXR Results from Hospital Encounter encounter on 07/26/21    XR CHEST PORT    Narrative  EXAM: CHEST RADIOGRAPH    CLINICAL INDICATION/HISTORY: left pneumothorax  -Additional: None    COMPARISON: 7/28/2021    TECHNIQUE: Portable frontal view of the chest    _______________    FINDINGS:    SUPPORT DEVICES: None. HEART AND MEDIASTINUM: No appreciable cardiomegaly. Remaining mediastinal  contours within normal limits. LUNGS AND PLEURAL SPACES: Clear. No consolidation, mass or effusion. No residual  pneumothorax. BONY THORAX AND SOFT TISSUES: Unremarkable.    _______________    Impression  No residual pneumothorax. ·Please note: Voice-recognition software may have been used to generate this report, which may have resulted in some phonetic-based errors in grammar and contents. Even though attempts were made to correct all the mistakes, some may have been missed, and remained in the body of the document.       Dl Collier MD  7/29/2021

## 2021-07-29 NOTE — PROGRESS NOTES
Bedside and verbal shift change report recieved from [place] (offgoing nurse) given to Shaista Perry RN (oncoming nurse).  Report included the following information SBAR, Kardex, Intake/Output, MAR and Recent Results

## 2021-07-29 NOTE — PROGRESS NOTES
Chart reviewed, pt transferred from ICU yesterday, now on 2Lpm, f/u appoint for d/c planning has been scheduled with Dr. Ashley Webre, appointment listed in the follow up section, pt would like assistance with finding new Pcp , cms will assist with new pcp, cm will cont to review and remain available for any additional needs.

## 2021-07-30 NOTE — DISCHARGE SUMMARY
Discharge Summary    Patient: Jose M Kelly MRN: [id number]  CSN: [id number]    YOB: 1980  Age: 41 y.o. Sex: female    DOA: 7/26/2021 LOS:  LOS: 3 days   Discharge Date: 7/29/2021     Primary Care Provider:  Nely Vital MD    Admission Diagnoses: Pneumothorax on left [J93.9]    Discharge Diagnoses:    Problem List as of 7/29/2021 Never Reviewed        Codes Class Noted - Resolved    Marijuana use ICD-10-CM: F12.90  ICD-9-CM: 305.20  7/29/2021 - Present        Pneumothorax on left ICD-10-CM: J93.9  ICD-9-CM: 512.89  7/26/2021 - Present        Left-sided chest pain ICD-10-CM: R07.9  ICD-9-CM: 786.50  7/26/2021 - Present        Vapes non-nicotine containing substance ICD-10-CM: Z72.89  ICD-9-CM: V69.8  7/26/2021 - Present        * (Principal) Spontaneous pneumothorax ICD-10-CM: J93.83  ICD-9-CM: 512.89  7/26/2021 - Present        Cigarette nicotine dependence in remission ICD-10-CM: F17.211  ICD-9-CM: V15.82  7/26/2021 - Present              Discharge Medications: There are no discharge medications for this patient. Discharge Condition: Good    Procedures : chest tube placement    Consults: Pulmonary/Critical Care      PHYSICAL EXAM   Visit Vitals  BP (!) 159/80 (BP 1 Location: Right arm, BP Patient Position: At rest)   Pulse 66   Temp 97.6 °F (36.4 °C)   Resp 16   Ht 5' 4\" (1.626 m)   Wt 65 kg (143 lb 3.2 oz)   SpO2 98%   BMI 24.58 kg/m²     General: Awake, cooperative, no acute distress    HEENT: NC, Atraumatic. PERRLA, EOMI. Anicteric sclerae. Lungs:  CTA Bilaterally. No Wheezing/Rhonchi/Rales. Heart:  Regular  rhythm,  No murmur, No Rubs, No Gallops  Abdomen: Soft, Non distended, Non tender. +Bowel sounds,   Extremities: No c/c/e  Psych:   Not anxious or agitated. Neurologic:  No acute neurological deficits.                                      Admission HPI :   Jose M Kelly is a 41 y.o. female with no significant past history presents to ER with concerns of left-sided chest pain.  Patient reports that she had sharp stabbing chest pain that started 2 days ago. She was seen at urgent care and initially she was told that she has fluid around her heart she was discharged on amoxicillin and indomethacin. This morning she was called and was asked to go to the emergency room as the chest x-ray showed no pneumothorax. She does endorse some shortness of breath but does not appear to be in respiratory distress. She quit smoking and now vapes. In ER she was noted to have left pneumothorax. Hospital Course :   Ms. Meryl Gramajo was admitted to ICU, she was seen and followed by pulmonary critical care. She had chest tube placed and hooked to water seal suction. Follow up CXR showed improvement in pneumothorax, her chest tube removed. CXR after chest tube removal showed tiny apical pneumothorax. Repeat CXR done today showed resolution of pneumothorax. Activity: Activity as tolerated    Diet: Regular Diet    Follow-up: PCP, pulmonary    Disposition: home    Minutes spent on discharge: 45       Labs: Results:       Chemistry Recent Labs     07/29/21 0225 07/28/21 0425 07/27/21  0455   GLU 75 70* 82    139 141   K 4.0 4.7 3.8    107 109   CO2 29 30 27   BUN 13 9 9   CREA 0.58* 0.62 0.52*   CA 9.1 9.1 8.7   AGAP 5 2* 5   BUCR 22* 15 17      CBC w/Diff Recent Labs     07/29/21 0225 07/28/21 0425 07/27/21  0455   WBC 4.5* 4.7 6.2   RBC 3.97* 3.93* 3.65*   HGB 11.9* 12.0 11.1*   HCT 37.3 37.4 34.7*    229 226      Cardiac Enzymes No results for input(s): CPK, CKND1, KRISSY in the last 72 hours. No lab exists for component: CKRMB, TROIP   Coagulation No results for input(s): PTP, INR, APTT, INREXT in the last 72 hours. Lipid Panel No results found for: CHOL, CHOLPOCT, CHOLX, CHLST, CHOLV, 816763, HDL, HDLP, LDL, LDLC, DLDLP, 959263, VLDLC, VLDL, TGLX, TRIGL, TRIGP, TGLPOCT, CHHD, CHHDX   BNP No results for input(s): BNPP in the last 72 hours.    Liver Enzymes No results for input(s): TP, ALB, TBIL, AP in the last 72 hours. No lab exists for component: SGOT, GPT, DBIL   Thyroid Studies No results found for: T4, T3U, TSH, TSHEXT         Significant Diagnostic Studies: IR DRAIN CHEST    Result Date: 7/27/2021  FLUOROSCOPICALLY GUIDED LEFT THORACOSTOMY TUBE PLACEMENT ATTENDING: Luke Murray M.D. COMPLICATIONS: None. MEDICATIONS: Lidocaine 1% subcutaneous. INDICATION: Left pneumothorax, shortness of breath. PROCEDURE: Informed consent was obtained where the risks, benefits and alternatives to the procedure were explained. All elements of maximal sterile barrier technique followed including: cap and mask and sterile gown and sterile gloves and a large sterile sheet and hand hygiene and 2% chlorhexidine for cutaneous antisepsis (or acceptable alternative antiseptics, per current guidelines). A qualified radiology nurse monitored the patient vitals signs and condition throughout the procedure. The patient's left thorax was prepped and draped in sterile fashion. Using fluoroscopic guidance, a 5 Western Zaira Yueh needle catheter was inserted into the left pleural space. Then over wire, an 8 Western Zaira multipurpose drainage catheter was placed with attached to Pleur-evac suction. Catheter secured in place. Sterile dressing was applied to the area. Patient tolerated procedure well. Fluoroscopic dose (reference air kerma): 14.5 mGy     Left thoracostomy tube placement for pneumothorax. XR CHEST PORT    Result Date: 7/29/2021  EXAM: CHEST RADIOGRAPH CLINICAL INDICATION/HISTORY: left pneumothorax -Additional: None COMPARISON: 7/28/2021 TECHNIQUE: Portable frontal view of the chest _______________ FINDINGS: SUPPORT DEVICES: None. HEART AND MEDIASTINUM: No appreciable cardiomegaly. Remaining mediastinal contours within normal limits. LUNGS AND PLEURAL SPACES: Clear. No consolidation, mass or effusion. No residual pneumothorax.  BONY THORAX AND SOFT TISSUES: Unremarkable. _______________     No residual pneumothorax. XR CHEST PORT    Result Date: 7/28/2021  EXAM: XR CHEST PORT CLINICAL INDICATION/HISTORY: pnemothorax -Additional: None COMPARISON: Radiograph obtained 4 hours prior TECHNIQUE: Portable frontal view of the chest _______________ FINDINGS: SUPPORT DEVICES: None. HEART AND MEDIASTINUM: Stable appearing cardiac size and mediastinal contours. LUNGS AND PLEURAL SPACES: Tiny left apical pneumothorax is noted, appearing slightly decreased in size from comparison study above. No right-sided pneumothorax or pleural effusion. No alveolar consolidation. BONY THORAX AND SOFT TISSUES: Unremarkable. _______________     1. Progressive decrease in a tiny left apical pneumothorax on follow-up imaging. XR CHEST PORT    Result Date: 7/28/2021  EXAM: One-view chest CLINICAL HISTORY: left PTX; s/p removal of chest tube. , COMPARISON: None FINDINGS: Frontal view of the chest demonstrate question of tiny left apical pneumothorax. Cardiac silhouette is normal in size and contour. No acute bony or soft tissue abnormality. Question of very tiny left apical pneumothorax, stable. Otherwise unremarkable    XR CHEST PORT    Result Date: 7/28/2021  EXAM: One-view chest CLINICAL HISTORY: Pneumothorax , COMPARISON: None FINDINGS: Frontal view of the chest demonstrate clear lungs. No pneumothorax identified. Left basilar chest tube in stable position. Cardiac silhouette is normal in size and contour. No acute bony or soft tissue abnormality. No pneumothorax identified. Left-sided chest tube in stable position. XR CHEST PORT    Result Date: 7/27/2021  EXAM: XR CHEST PORT CLINICAL INDICATION/HISTORY: chest tube, left side pain -Additional: None COMPARISON: 7/27/2021 TECHNIQUE: Portable frontal view of the chest _______________ FINDINGS: SUPPORT DEVICES: None. HEART AND MEDIASTINUM: Cardiomediastinal silhouette within normal limits. LUNGS AND PLEURAL SPACES: No dense consolidation, large effusion or pneumothorax. _______________     No acute cardiopulmonary abnormality. XR CHEST PORT    Result Date: 7/27/2021  EXAM: XR CHEST PORT CLINICAL INDICATION/HISTORY: Chest tube, left-sided pneumothorax. -Additional: None COMPARISON: Several prior radiographs, most recently July 27, 2021 TECHNIQUE: Portable frontal view of the chest _______________ FINDINGS: SUPPORT DEVICES: Left-sided thoracostomy tube in stable position with formed loop at the periphery of the left midlung zone. HEART AND MEDIASTINUM: Normal cardiac size and mediastinal contours. LUNGS AND PLEURAL SPACES: No appreciable pneumothorax. Lungs appear clear. No pleural effusion. BONY THORAX AND SOFT TISSUES: Unremarkable. _______________     1. Indwelling left-sided thoracostomy tube without appreciable pneumothorax. XR CHEST PORT    Result Date: 7/27/2021  CHEST PA, LATERAL Indication: Chest tube, pneumothorax. Comparison: X-ray 07/26/2021. Findings: Stable left inferior lateral pigtail pleural catheter. There is no appreciable pneumothorax. Minimal streaky opacity in the left mid to upper lung zones suggestive of subsegmental atelectasis or scarring. Remainder of the lung parenchyma appears clear. The cardiac silhouette and pulmonary vascularity appear within normal limits. Costophrenic angles are sharp. No evidence pleural effusion. Stable left chest tube without appreciable pneumothorax. Suspect left midlung subsegmental atelectasis or scarring. XR CHEST PORT    Result Date: 7/26/2021  EXAM: XR CHEST PORT CLINICAL INDICATION/HISTORY: Post chest tube placement -Additional: None COMPARISON: Chest x-ray from earlier same day at 1307 hours TECHNIQUE: Frontal view of the chest _______________ FINDINGS: HEART AND MEDIASTINUM: Midline cardiac silhouette, normal in size. Unremarkable hilar vascular structures. LUNGS AND PLEURAL SPACES: Interval placement of a smallbore pigtail thoracostomy tube in the lateral left lower thorax.  Interval improved left thorax with small apical and tiny basilar pneumothorax. Persisting bandlike and thick discoid left hilar and retrocardiac left lower lung atelectasis. No acute right lung process. BONY THORAX AND SOFT TISSUES: No acute or destructive osseous abnormality. _______________     1. Interval left-sided chest tube placement. Significant improved left hemithoracic aeration with residual left apical, left basilar pneumothoraces and persistent atelectasis. XR CHEST INSP AND EXP    Result Date: 7/26/2021  EXAM: XR CHEST INSP AND EXP CLINICAL INDICATION/HISTORY: Left-sided pneumothorax and chest pain -Additional: None COMPARISON: None TECHNIQUE: Frontal view of the chest obtained across inspiration and expiration _______________ FINDINGS: HEART AND MEDIASTINUM: Normal cardiac size and mediastinal contours. LUNGS AND PLEURAL SPACES: Large left-sided pneumothorax with collapse of the left upper and left lower lobes. No evidence of mediastinal shift. Right lung clear. BONY THORAX AND SOFT TISSUES: No acute osseous abnormality demonstrated. _______________     1. Large left pneumothorax without evidence of mediastinal shift. CRITICAL RESULT:  Findings called to Dr. Eric Chowdary in the emergency room prior to dictation at 1328 hours, 7/26/2021         No results found for this or any previous visit. Please note that this dictation was completed with [email or web address], the computer voice recognition software. Quite often unanticipated grammatical, syntax, homophones, and other interpretive errors are inadvertently transcribed by the computer software. Please disregard these errors. Please excuse any errors that have escaped final proofreading.      CC: Candido Stacy MD

## 2022-03-18 PROBLEM — F17.211 CIGARETTE NICOTINE DEPENDENCE IN REMISSION: Status: ACTIVE | Noted: 2021-07-26

## 2022-03-18 PROBLEM — Z72.89 VAPES NON-NICOTINE CONTAINING SUBSTANCE: Status: ACTIVE | Noted: 2021-07-26

## 2022-03-19 PROBLEM — F12.90 MARIJUANA USE: Status: ACTIVE | Noted: 2021-07-29

## 2022-03-19 PROBLEM — J93.83 SPONTANEOUS PNEUMOTHORAX: Status: ACTIVE | Noted: 2021-07-26

## 2022-03-19 PROBLEM — R07.9 LEFT-SIDED CHEST PAIN: Status: ACTIVE | Noted: 2021-07-26

## 2022-03-20 PROBLEM — J93.9 PNEUMOTHORAX ON LEFT: Status: ACTIVE | Noted: 2021-07-26

## 2023-06-04 ENCOUNTER — HOSPITAL ENCOUNTER (EMERGENCY)
Facility: HOSPITAL | Age: 43
Discharge: ANOTHER HOSPITAL | End: 2023-06-04
Attending: STUDENT IN AN ORGANIZED HEALTH CARE EDUCATION/TRAINING PROGRAM
Payer: MEDICAID

## 2023-06-04 ENCOUNTER — APPOINTMENT (OUTPATIENT)
Facility: HOSPITAL | Age: 43
End: 2023-06-04
Payer: MEDICAID

## 2023-06-04 VITALS
RESPIRATION RATE: 15 BRPM | WEIGHT: 150 LBS | HEIGHT: 64 IN | OXYGEN SATURATION: 100 % | SYSTOLIC BLOOD PRESSURE: 110 MMHG | BODY MASS INDEX: 25.61 KG/M2 | DIASTOLIC BLOOD PRESSURE: 81 MMHG | TEMPERATURE: 98.6 F | HEART RATE: 67 BPM

## 2023-06-04 DIAGNOSIS — R07.89 CHEST WALL PAIN: Primary | ICD-10-CM

## 2023-06-04 DIAGNOSIS — J93.83 SPONTANEOUS PNEUMOTHORAX: ICD-10-CM

## 2023-06-04 LAB
ALBUMIN SERPL-MCNC: 3.5 G/DL (ref 3.4–5)
ALBUMIN/GLOB SERPL: 1.1 (ref 0.8–1.7)
ALP SERPL-CCNC: 70 U/L (ref 45–117)
ALT SERPL-CCNC: 14 U/L (ref 13–56)
ANION GAP SERPL CALC-SCNC: 5 MMOL/L (ref 3–18)
AST SERPL-CCNC: 16 U/L (ref 10–38)
BASOPHILS # BLD: 0.1 K/UL (ref 0–0.1)
BASOPHILS NFR BLD: 1 % (ref 0–2)
BILIRUB SERPL-MCNC: 0.3 MG/DL (ref 0.2–1)
BUN SERPL-MCNC: 15 MG/DL (ref 7–18)
BUN/CREAT SERPL: 21 (ref 12–20)
CALCIUM SERPL-MCNC: 8.8 MG/DL (ref 8.5–10.1)
CHLORIDE SERPL-SCNC: 110 MMOL/L (ref 100–111)
CO2 SERPL-SCNC: 25 MMOL/L (ref 21–32)
CREAT SERPL-MCNC: 0.7 MG/DL (ref 0.6–1.3)
DIFFERENTIAL METHOD BLD: ABNORMAL
EKG ATRIAL RATE: 104 BPM
EKG DIAGNOSIS: NORMAL
EKG P AXIS: 80 DEGREES
EKG P-R INTERVAL: 142 MS
EKG Q-T INTERVAL: 326 MS
EKG QRS DURATION: 66 MS
EKG QTC CALCULATION (BAZETT): 428 MS
EKG R AXIS: -21 DEGREES
EKG T AXIS: 61 DEGREES
EKG VENTRICULAR RATE: 104 BPM
EOSINOPHIL # BLD: 0.3 K/UL (ref 0–0.4)
EOSINOPHIL NFR BLD: 4 % (ref 0–5)
ERYTHROCYTE [DISTWIDTH] IN BLOOD BY AUTOMATED COUNT: 12.1 % (ref 11.6–14.5)
GLOBULIN SER CALC-MCNC: 3.3 G/DL (ref 2–4)
GLUCOSE SERPL-MCNC: 85 MG/DL (ref 74–99)
HCT VFR BLD AUTO: 38.8 % (ref 35–45)
HGB BLD-MCNC: 12.8 G/DL (ref 12–16)
IMM GRANULOCYTES # BLD AUTO: 0 K/UL (ref 0–0.04)
IMM GRANULOCYTES NFR BLD AUTO: 0 % (ref 0–0.5)
LYMPHOCYTES # BLD: 2.4 K/UL (ref 0.9–3.6)
LYMPHOCYTES NFR BLD: 30 % (ref 21–52)
MAGNESIUM SERPL-MCNC: 2 MG/DL (ref 1.6–2.6)
MCH RBC QN AUTO: 30.6 PG (ref 24–34)
MCHC RBC AUTO-ENTMCNC: 33 G/DL (ref 31–37)
MCV RBC AUTO: 92.8 FL (ref 78–100)
MONOCYTES # BLD: 0.4 K/UL (ref 0.05–1.2)
MONOCYTES NFR BLD: 5 % (ref 3–10)
NEUTS SEG # BLD: 4.9 K/UL (ref 1.8–8)
NEUTS SEG NFR BLD: 61 % (ref 40–73)
NRBC # BLD: 0 K/UL (ref 0–0.01)
NRBC BLD-RTO: 0 PER 100 WBC
PLATELET # BLD AUTO: 268 K/UL (ref 135–420)
PMV BLD AUTO: 9.2 FL (ref 9.2–11.8)
POTASSIUM SERPL-SCNC: 3.8 MMOL/L (ref 3.5–5.5)
PROT SERPL-MCNC: 6.8 G/DL (ref 6.4–8.2)
RBC # BLD AUTO: 4.18 M/UL (ref 4.2–5.3)
SODIUM SERPL-SCNC: 140 MMOL/L (ref 136–145)
TROPONIN I SERPL HS-MCNC: <3 NG/L (ref 0–54)
WBC # BLD AUTO: 8 K/UL (ref 4.6–13.2)

## 2023-06-04 PROCEDURE — 93005 ELECTROCARDIOGRAM TRACING: CPT | Performed by: STUDENT IN AN ORGANIZED HEALTH CARE EDUCATION/TRAINING PROGRAM

## 2023-06-04 PROCEDURE — 71045 X-RAY EXAM CHEST 1 VIEW: CPT

## 2023-06-04 PROCEDURE — 83735 ASSAY OF MAGNESIUM: CPT

## 2023-06-04 PROCEDURE — 32551 INSERTION OF CHEST TUBE: CPT

## 2023-06-04 PROCEDURE — 99285 EMERGENCY DEPT VISIT HI MDM: CPT

## 2023-06-04 PROCEDURE — 84484 ASSAY OF TROPONIN QUANT: CPT

## 2023-06-04 PROCEDURE — 80053 COMPREHEN METABOLIC PANEL: CPT

## 2023-06-04 PROCEDURE — 85025 COMPLETE CBC W/AUTO DIFF WBC: CPT

## 2023-06-04 PROCEDURE — 96374 THER/PROPH/DIAG INJ IV PUSH: CPT

## 2023-06-04 PROCEDURE — 96376 TX/PRO/DX INJ SAME DRUG ADON: CPT

## 2023-06-04 PROCEDURE — 6360000002 HC RX W HCPCS: Performed by: STUDENT IN AN ORGANIZED HEALTH CARE EDUCATION/TRAINING PROGRAM

## 2023-06-04 RX ORDER — LIDOCAINE HYDROCHLORIDE 10 MG/ML
5 INJECTION, SOLUTION EPIDURAL; INFILTRATION; INTRACAUDAL; PERINEURAL
Status: DISCONTINUED | OUTPATIENT
Start: 2023-06-04 | End: 2023-06-04 | Stop reason: HOSPADM

## 2023-06-04 RX ORDER — FENTANYL CITRATE 50 UG/ML
50 INJECTION, SOLUTION INTRAMUSCULAR; INTRAVENOUS
Status: DISCONTINUED | OUTPATIENT
Start: 2023-06-04 | End: 2023-06-04 | Stop reason: HOSPADM

## 2023-06-04 RX ORDER — FENTANYL CITRATE 50 UG/ML
50 INJECTION, SOLUTION INTRAMUSCULAR; INTRAVENOUS
Status: COMPLETED | OUTPATIENT
Start: 2023-06-04 | End: 2023-06-04

## 2023-06-04 RX ADMIN — FENTANYL CITRATE 50 MCG: 50 INJECTION, SOLUTION INTRAMUSCULAR; INTRAVENOUS at 19:56

## 2023-06-04 RX ADMIN — FENTANYL CITRATE 50 MCG: 50 INJECTION, SOLUTION INTRAMUSCULAR; INTRAVENOUS at 18:03

## 2023-06-04 RX ADMIN — FENTANYL CITRATE 50 MCG: 50 INJECTION, SOLUTION INTRAMUSCULAR; INTRAVENOUS at 15:37

## 2023-06-04 ASSESSMENT — PAIN - FUNCTIONAL ASSESSMENT: PAIN_FUNCTIONAL_ASSESSMENT: 0-10

## 2023-06-04 ASSESSMENT — PAIN SCALES - GENERAL
PAINLEVEL_OUTOF10: 9
PAINLEVEL_OUTOF10: 10
PAINLEVEL_OUTOF10: 8